# Patient Record
Sex: MALE | Race: WHITE | NOT HISPANIC OR LATINO | URBAN - METROPOLITAN AREA
[De-identification: names, ages, dates, MRNs, and addresses within clinical notes are randomized per-mention and may not be internally consistent; named-entity substitution may affect disease eponyms.]

---

## 2020-11-23 ENCOUNTER — HOSPITAL ENCOUNTER (EMERGENCY)
Facility: HOSPITAL | Age: 30
Discharge: HOME | End: 2020-11-23
Attending: EMERGENCY MEDICINE | Admitting: EMERGENCY MEDICINE

## 2020-11-23 ENCOUNTER — APPOINTMENT (OUTPATIENT)
Dept: GENERAL RADIOLOGY | Facility: HOSPITAL | Age: 30
End: 2020-11-23

## 2020-11-23 VITALS
BODY MASS INDEX: 28.44 KG/M2 | RESPIRATION RATE: 18 BRPM | OXYGEN SATURATION: 98 % | HEART RATE: 64 BPM | TEMPERATURE: 98.7 F | WEIGHT: 210 LBS | SYSTOLIC BLOOD PRESSURE: 140 MMHG | HEIGHT: 72 IN | DIASTOLIC BLOOD PRESSURE: 79 MMHG

## 2020-11-23 DIAGNOSIS — R07.9 CHEST PAIN, UNSPECIFIED TYPE: Primary | ICD-10-CM

## 2020-11-23 LAB
ALBUMIN SERPL-MCNC: 4.3 G/DL (ref 3.5–5.2)
ALBUMIN/GLOB SERPL: 1.5 G/DL
ALP SERPL-CCNC: 68 U/L (ref 39–117)
ALT SERPL W P-5'-P-CCNC: 27 U/L (ref 1–41)
ANION GAP SERPL CALCULATED.3IONS-SCNC: 9.4 MMOL/L (ref 5–15)
AST SERPL-CCNC: 24 U/L (ref 1–40)
BASOPHILS # BLD AUTO: 0.04 10*3/MM3 (ref 0–0.2)
BASOPHILS NFR BLD AUTO: 0.7 % (ref 0–1.5)
BILIRUB SERPL-MCNC: 0.6 MG/DL (ref 0–1.2)
BUN SERPL-MCNC: 11 MG/DL (ref 6–20)
BUN/CREAT SERPL: 8.9 (ref 7–25)
CALCIUM SPEC-SCNC: 9.4 MG/DL (ref 8.6–10.5)
CHLORIDE SERPL-SCNC: 100 MMOL/L (ref 98–107)
CO2 SERPL-SCNC: 26.6 MMOL/L (ref 22–29)
CREAT SERPL-MCNC: 1.24 MG/DL (ref 0.76–1.27)
DEPRECATED RDW RBC AUTO: 39.5 FL (ref 37–54)
EOSINOPHIL # BLD AUTO: 0.07 10*3/MM3 (ref 0–0.4)
EOSINOPHIL NFR BLD AUTO: 1.2 % (ref 0.3–6.2)
ERYTHROCYTE [DISTWIDTH] IN BLOOD BY AUTOMATED COUNT: 12.7 % (ref 12.3–15.4)
GFR SERPL CREATININE-BSD FRML MDRD: 68 ML/MIN/1.73
GLOBULIN UR ELPH-MCNC: 2.9 GM/DL
GLUCOSE SERPL-MCNC: 97 MG/DL (ref 65–99)
HCT VFR BLD AUTO: 46.6 % (ref 37.5–51)
HGB BLD-MCNC: 16.1 G/DL (ref 13–17.7)
HOLD SPECIMEN: NORMAL
HOLD SPECIMEN: NORMAL
IMM GRANULOCYTES # BLD AUTO: 0.01 10*3/MM3 (ref 0–0.05)
IMM GRANULOCYTES NFR BLD AUTO: 0.2 % (ref 0–0.5)
LYMPHOCYTES # BLD AUTO: 1.46 10*3/MM3 (ref 0.7–3.1)
LYMPHOCYTES NFR BLD AUTO: 25.9 % (ref 19.6–45.3)
MCH RBC QN AUTO: 30 PG (ref 26.6–33)
MCHC RBC AUTO-ENTMCNC: 34.5 G/DL (ref 31.5–35.7)
MCV RBC AUTO: 86.9 FL (ref 79–97)
MONOCYTES # BLD AUTO: 0.4 10*3/MM3 (ref 0.1–0.9)
MONOCYTES NFR BLD AUTO: 7.1 % (ref 5–12)
NEUTROPHILS NFR BLD AUTO: 3.65 10*3/MM3 (ref 1.7–7)
NEUTROPHILS NFR BLD AUTO: 64.9 % (ref 42.7–76)
NRBC BLD AUTO-RTO: 0 /100 WBC (ref 0–0.2)
PLATELET # BLD AUTO: 188 10*3/MM3 (ref 140–450)
PMV BLD AUTO: 9.1 FL (ref 6–12)
POTASSIUM SERPL-SCNC: 3.8 MMOL/L (ref 3.5–5.2)
PROT SERPL-MCNC: 7.2 G/DL (ref 6–8.5)
QT INTERVAL: 433 MS
RBC # BLD AUTO: 5.36 10*6/MM3 (ref 4.14–5.8)
SODIUM SERPL-SCNC: 136 MMOL/L (ref 136–145)
TROPONIN T SERPL-MCNC: <0.01 NG/ML (ref 0–0.03)
TROPONIN T SERPL-MCNC: <0.01 NG/ML (ref 0–0.03)
WBC # BLD AUTO: 5.63 10*3/MM3 (ref 3.4–10.8)
WHOLE BLOOD HOLD SPECIMEN: NORMAL
WHOLE BLOOD HOLD SPECIMEN: NORMAL

## 2020-11-23 PROCEDURE — 99284 EMERGENCY DEPT VISIT MOD MDM: CPT

## 2020-11-23 PROCEDURE — 93005 ELECTROCARDIOGRAM TRACING: CPT | Performed by: EMERGENCY MEDICINE

## 2020-11-23 PROCEDURE — 84484 ASSAY OF TROPONIN QUANT: CPT | Performed by: EMERGENCY MEDICINE

## 2020-11-23 PROCEDURE — 93010 ELECTROCARDIOGRAM REPORT: CPT | Performed by: INTERNAL MEDICINE

## 2020-11-23 PROCEDURE — 71046 X-RAY EXAM CHEST 2 VIEWS: CPT

## 2020-11-23 PROCEDURE — 85025 COMPLETE CBC W/AUTO DIFF WBC: CPT

## 2020-11-23 PROCEDURE — 80053 COMPREHEN METABOLIC PANEL: CPT | Performed by: EMERGENCY MEDICINE

## 2020-11-23 PROCEDURE — 93005 ELECTROCARDIOGRAM TRACING: CPT

## 2020-11-23 RX ORDER — SODIUM CHLORIDE 0.9 % (FLUSH) 0.9 %
10 SYRINGE (ML) INJECTION AS NEEDED
Status: DISCONTINUED | OUTPATIENT
Start: 2020-11-23 | End: 2020-11-23 | Stop reason: HOSPADM

## 2020-11-23 RX ORDER — METHIMAZOLE 5 MG/1
5 TABLET ORAL DAILY
COMMUNITY

## 2020-11-23 RX ORDER — ESCITALOPRAM OXALATE 20 MG/1
20 TABLET ORAL DAILY
COMMUNITY

## 2020-11-23 RX ORDER — ASPIRIN 325 MG
325 TABLET ORAL ONCE
Status: COMPLETED | OUTPATIENT
Start: 2020-11-23 | End: 2020-11-23

## 2020-11-23 RX ADMIN — ASPIRIN 325 MG: 325 TABLET ORAL at 12:15

## 2020-11-23 NOTE — ED PROVIDER NOTES
12:42 EST  Patient seen and examined with physician assistant.  Briefly patient presents for evaluation of chest pain.  States he has been having chest pain for about a week.  States pain got worse in his arm and chest today.  This was lasted just a few seconds.  The low-level chest pain has been present for a week.  Patient has no lower extremity swelling.  No hemoptysis.  Is not exertional.  He actually worked out yesterday and had no pain with exertion.  Has had no vomiting or diarrhea.          On exam patient is alert cooperative in no distress.  Patient's heart regular rate and rhythm.  Patient's lungs are clear bilaterally.  Patient's chest and arm are nontender with movement.PERC negative        Plan is EKG chest x-ray and lab evaluation      MD ATTESTATION NOTE    The JORGE LUIS and I have discussed this patient's history, physical exam, and treatment plan.  I have reviewed the documentation and personally had a face to face interaction with the patient. I affirm the documentation and agree with the treatment and plan.  The attached note describes my personal findings.      Patient was wearing a face mask when I entered the room and they continued to wear a mask throughout their stay in the ED.  I wore PPE, including a gown, gloves, face mask with shield or face mask with goggles whenever I was in the room with patient.            Brody Fitch MD  11/23/20 9821

## 2020-11-23 NOTE — ED NOTES
Pt noted to have mask on when this RN entered the room.  This RN wore appropriate PPE throughout our encounter. Hand hygiene performed upon entering and exiting room.       Joleen Vizcarra RN  11/23/20 9210

## 2020-11-23 NOTE — ED NOTES
Patient here today for chest pain for the last 2 days, denies chest pain at present but does c/o left arm pain with a pain level of 4.  No nausea or vomiting at this time.     Joleen Vizcarra, RN  11/23/20 0911

## 2020-11-23 NOTE — ED NOTES
Pt noted to have mask on when this RN entered the room.  This RN wore appropriate PPE throughout our encounter. Hand hygiene performed upon entering and exiting room.       Cathie Garcia, RN  11/23/20 2496

## 2020-11-23 NOTE — ED PROVIDER NOTES
EMERGENCY DEPARTMENT ENCOUNTER    Room Number:  32/32  Date of encounter:  11/23/2020  PCP: Rodney Mccoy MD  Historian: Patient      HPI:  Chief Complaint: Chest pain  A complete HPI/ROS/PMH/PSH/SH/FH are unobtainable due to: Nothing    Context: Brian Castellon is a very pleasant 30 y.o. male who presents to the ED c/o ongoing chest pain for about a week.  Patient states the pain has been a constant 2 out of 10 and dull on the pain scale since its onset and is associated with short exacerbations of pain that can be as bad as an 8 out of 10 on the pain scale.  He describes the pain as a pressure, substernal, radiates to the left arm, and is associated with nausea.  There has been no active vomiting or diaphoresis.  The chest pain is state to not be exertional and the exacerbations tend to come on at rest.  The patient also denies associated fever, cough, chills or any recent sick contacts.  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      PAST MEDICAL HISTORY  Active Ambulatory Problems     Diagnosis Date Noted   • No Active Ambulatory Problems     Resolved Ambulatory Problems     Diagnosis Date Noted   • No Resolved Ambulatory Problems     No Additional Past Medical History         PAST SURGICAL HISTORY  History reviewed. No pertinent surgical history.      FAMILY HISTORY  History reviewed. No pertinent family history.      SOCIAL HISTORY  Social History     Socioeconomic History   • Marital status:      Spouse name: Not on file   • Number of children: Not on file   • Years of education: Not on file   • Highest education level: Not on file   Tobacco Use   • Smoking status: Never Smoker   Substance and Sexual Activity   • Alcohol use: Not Currently   • Drug use: Never   • Sexual activity: Yes     Partners: Female         ALLERGIES  Patient has no known allergies.        REVIEW OF SYSTEMS  Review of Systems   Constitutional: Negative for  chills and fever.   HENT: Negative for congestion.    Eyes: Negative.    Respiratory: Negative for cough and shortness of breath.    Cardiovascular: Positive for chest pain. Negative for palpitations and leg swelling.   Gastrointestinal: Positive for nausea. Negative for abdominal pain, diarrhea and vomiting.   Genitourinary: Negative.    Musculoskeletal: Negative.    Skin: Negative.    Neurological: Negative.    Psychiatric/Behavioral: Negative.         All systems reviewed and negative except for those discussed in HPI.       PHYSICAL EXAM    I have reviewed the triage vital signs and nursing notes.    ED Triage Vitals   Temp Heart Rate Resp BP SpO2   11/23/20 1115 11/23/20 1115 11/23/20 1132 11/23/20 1123 11/23/20 1115   98.7 °F (37.1 °C) 64 14 141/84 97 %      Temp src Heart Rate Source Patient Position BP Location FiO2 (%)   11/23/20 1115 11/23/20 1132 11/23/20 1132 11/23/20 1132 --   Tympanic Monitor Lying Left arm        Physical Exam  GENERAL: Very pleasant well-developed well-nourished male in no acute distress  HENT: nares patent, mucous membranes moist, atraumatic  EYES: no scleral icterus  CV: regular rhythm, regular rate, no rubs murmurs gallops, no unilateral leg swelling pedal pulses bilaterally  RESPIRATORY: normal effort, lungs CTA B  ABDOMEN: soft, nontender  MUSCULOSKELETAL: no deformity  NEURO: alert and oriented x4, moves all extremities, follows commands  SKIN: warm, dry        LAB RESULTS  Recent Results (from the past 24 hour(s))   ECG 12 Lead    Collection Time: 11/23/20 11:25 AM   Result Value Ref Range    QT Interval 433 ms   Comprehensive Metabolic Panel    Collection Time: 11/23/20 11:29 AM    Specimen: Blood   Result Value Ref Range    Glucose 97 65 - 99 mg/dL    BUN 11 6 - 20 mg/dL    Creatinine 1.24 0.76 - 1.27 mg/dL    Sodium 136 136 - 145 mmol/L    Potassium 3.8 3.5 - 5.2 mmol/L    Chloride 100 98 - 107 mmol/L    CO2 26.6 22.0 - 29.0 mmol/L    Calcium 9.4 8.6 - 10.5 mg/dL    Total  Protein 7.2 6.0 - 8.5 g/dL    Albumin 4.30 3.50 - 5.20 g/dL    ALT (SGPT) 27 1 - 41 U/L    AST (SGOT) 24 1 - 40 U/L    Alkaline Phosphatase 68 39 - 117 U/L    Total Bilirubin 0.6 0.0 - 1.2 mg/dL    eGFR Non African Amer 68 >60 mL/min/1.73    Globulin 2.9 gm/dL    A/G Ratio 1.5 g/dL    BUN/Creatinine Ratio 8.9 7.0 - 25.0    Anion Gap 9.4 5.0 - 15.0 mmol/L   Troponin    Collection Time: 11/23/20 11:29 AM    Specimen: Blood   Result Value Ref Range    Troponin T <0.010 0.000 - 0.030 ng/mL   Light Blue Top    Collection Time: 11/23/20 11:29 AM   Result Value Ref Range    Extra Tube hold for add-on    Green Top (Gel)    Collection Time: 11/23/20 11:29 AM   Result Value Ref Range    Extra Tube Hold for add-ons.    Lavender Top    Collection Time: 11/23/20 11:29 AM   Result Value Ref Range    Extra Tube hold for add-on    Gold Top - SST    Collection Time: 11/23/20 11:29 AM   Result Value Ref Range    Extra Tube Hold for add-ons.    CBC Auto Differential    Collection Time: 11/23/20 11:29 AM    Specimen: Blood   Result Value Ref Range    WBC 5.63 3.40 - 10.80 10*3/mm3    RBC 5.36 4.14 - 5.80 10*6/mm3    Hemoglobin 16.1 13.0 - 17.7 g/dL    Hematocrit 46.6 37.5 - 51.0 %    MCV 86.9 79.0 - 97.0 fL    MCH 30.0 26.6 - 33.0 pg    MCHC 34.5 31.5 - 35.7 g/dL    RDW 12.7 12.3 - 15.4 %    RDW-SD 39.5 37.0 - 54.0 fl    MPV 9.1 6.0 - 12.0 fL    Platelets 188 140 - 450 10*3/mm3    Neutrophil % 64.9 42.7 - 76.0 %    Lymphocyte % 25.9 19.6 - 45.3 %    Monocyte % 7.1 5.0 - 12.0 %    Eosinophil % 1.2 0.3 - 6.2 %    Basophil % 0.7 0.0 - 1.5 %    Immature Grans % 0.2 0.0 - 0.5 %    Neutrophils, Absolute 3.65 1.70 - 7.00 10*3/mm3    Lymphocytes, Absolute 1.46 0.70 - 3.10 10*3/mm3    Monocytes, Absolute 0.40 0.10 - 0.90 10*3/mm3    Eosinophils, Absolute 0.07 0.00 - 0.40 10*3/mm3    Basophils, Absolute 0.04 0.00 - 0.20 10*3/mm3    Immature Grans, Absolute 0.01 0.00 - 0.05 10*3/mm3    nRBC 0.0 0.0 - 0.2 /100 WBC   Troponin    Collection Time:  11/23/20  1:24 PM    Specimen: Blood   Result Value Ref Range    Troponin T <0.010 0.000 - 0.030 ng/mL       Ordered the above labs and independently reviewed the results.        RADIOLOGY  Xr Chest 2 View    Result Date: 11/23/2020  Chest radiograph  HISTORY:Chest pain  TECHNIQUE: Two PA and lateral radiographs  COMPARISON:None      FINDINGS AND IMPRESSION: There is no pulmonary consolidation. No pneumothorax is seen. Cardiac silhouette is within normal limits for size. There is no pleural effusion.  This report was finalized on 11/23/2020 12:50 PM by Dr. Sumanth Ely M.D.        I ordered the above noted radiological studies. Reviewed by me and discussed with radiologist.  See dictation for official radiology interpretation.      PROCEDURES    Procedures      MEDICATIONS GIVEN IN ER    Medications   aspirin tablet 325 mg (325 mg Oral Given 11/23/20 1215)         PROGRESS, DATA ANALYSIS, CONSULTS, AND MEDICAL DECISION MAKING    All labs have been independently reviewed by me.  All radiology studies have been reviewed by me and discussed with radiologist dictating the report.   EKG's independently viewed and interpreted by me.  Discussion below represents my analysis of pertinent findings related to patient's condition, differential diagnosis, treatment plan and final disposition.        ED Course as of Nov 23 2034   Mon Nov 23, 2020   1254 I viewed the patient's chest x-ray and there are no acute findings    [RC]   1254 WBC: 5.63 [RC]   1254 RBC: 5.36 [RC]   1254 Hemoglobin: 16.1 [RC]   1254 Hematocrit: 46.6 [RC]   1254 Platelets: 188 [RC]   1255 HEART SCORE    History 0  ECG Normal (0)  Age < or = 45 (0)  Risk factors 1 or 2 (1)  Troponin < or = Normal limit (0)    This patient's HEART score is 1    HEART Score Key:  Scores 0-3: 0.9-1.7% risk of adverse cardiac event. In the HEART Score study, these patients were discharged (0.99% in the retrospective study, 1.7% in the prospective study)  Scores 4-6: 12-16.6%  risk of adverse cardiac event. In the HEART Score study, these patients were admitted to the hospital. (11.6% retrospective, 16.6% prospective)  Scores ?7: 50-65% risk of adverse cardiac event. In the HEART Score study, these patients were candidates for early invasive measures. (65.2% retrospective, 50.1% prospective)       [RC]   1257 EKG          EKG time: 1125  Rhythm/Rate: 64/sinus  P waves and CO: Normal pr interval  QRS, axis: Normal Axis   ST and T waves: No acute st or t wave cgs     Interpreted Contemporaneously by me, independently viewed  -no prior to compare.      [RC]      ED Course User Index  [RC] Carter Hamilton III, PA       Patient was placed in face mask in first look. Patient was wearing facemask when I entered the room and throughout our encounter. I wore full protective equipment throughout this patient encounter including a face mask, eye shield, gown and gloves. Hand hygiene was performed before donning protective equipment and after removal when leaving the room.  AS OF 20:34 EST VITALS:    BP - 140/79  HR - 64  TEMP - 98.7 °F (37.1 °C) (Tympanic)  O2 SATS - 98%        DIAGNOSIS  Final diagnoses:   Chest pain, unspecified type         DISPOSITION  DISCHARGE    Patient discharged in stable condition.    Reviewed implications of results, diagnosis, meds, responsibility to follow up, warning signs and symptoms of possible worsening, potential complications and reasons to return to ER.    Patient/Family voiced understanding of above instructions.    Discussed plan for discharge, as there is no emergent indication for admission. Patient referred to primary care provider for BP management due to today's BP. Pt/family is agreeable and understands need for follow up and repeat testing.  Pt is aware that discharge does not mean that nothing is wrong but it indicates no emergency is present that requires admission and they must continue care with follow-up as given below or physician of their  choice.     FOLLOW-UP  [facility] CARDIOLOGY  [address]  [facility] [address] [phone]  Schedule an appointment as soon as possible for a visit   For further evaluation and treatment    Rodney Mccoy MD  [address] [place] [address]  [phone]    Schedule an appointment as soon as possible for a visit   For further evaluation and interim management         Medication List      No changes were made to your prescriptions during this visit.                Carter Hamilton III, PA  11/23/20 2036

## 2020-11-23 NOTE — ED TRIAGE NOTES
Patient to er from home with c/o started last week with left arm pain that is increasing. Patient reported chest pain and dizziness that started this am around 10am. Patient reported slight nausea with this. Patient has mask on in triage along with staff.

## 2021-04-16 ENCOUNTER — BULK ORDERING (OUTPATIENT)
Dept: CASE MANAGEMENT | Facility: OTHER | Age: 31
End: 2021-04-16

## 2021-04-16 DIAGNOSIS — Z23 IMMUNIZATION DUE: ICD-10-CM

## 2023-12-14 ENCOUNTER — OFFICE VISIT (OUTPATIENT)
Dept: INTERNAL MEDICINE | Facility: CLINIC | Age: 33
End: 2023-12-14
Payer: COMMERCIAL

## 2023-12-14 VITALS
TEMPERATURE: 97.2 F | SYSTOLIC BLOOD PRESSURE: 116 MMHG | DIASTOLIC BLOOD PRESSURE: 74 MMHG | BODY MASS INDEX: 31.56 KG/M2 | HEART RATE: 75 BPM | OXYGEN SATURATION: 97 % | RESPIRATION RATE: 16 BRPM | WEIGHT: 233 LBS | HEIGHT: 72 IN

## 2023-12-14 DIAGNOSIS — F41.0 PANIC ATTACK: ICD-10-CM

## 2023-12-14 DIAGNOSIS — Z23 ENCOUNTER FOR IMMUNIZATION: Primary | ICD-10-CM

## 2023-12-14 PROBLEM — E05.00 GRAVES DISEASE: Status: ACTIVE | Noted: 2018-04-16

## 2023-12-14 PROBLEM — E05.90 HYPERTHYROIDISM: Status: ACTIVE | Noted: 2017-09-07

## 2023-12-14 RX ORDER — LORAZEPAM 0.5 MG/1
0.5 TABLET ORAL EVERY 8 HOURS PRN
Qty: 30 TABLET | Refills: 0 | Status: SHIPPED | OUTPATIENT
Start: 2023-12-14

## 2023-12-14 RX ORDER — METHIMAZOLE 5 MG/1
10 TABLET ORAL DAILY
COMMUNITY
Start: 2023-11-08 | End: 2024-02-06

## 2023-12-14 RX ORDER — BUSPIRONE HYDROCHLORIDE 10 MG/1
10 TABLET ORAL 3 TIMES DAILY
Qty: 90 TABLET | Refills: 2 | Status: SHIPPED | OUTPATIENT
Start: 2023-12-14

## 2023-12-14 RX ORDER — ESCITALOPRAM OXALATE 20 MG/1
20 TABLET ORAL DAILY
Qty: 30 TABLET | Refills: 2 | Status: SHIPPED | OUTPATIENT
Start: 2023-12-14

## 2023-12-14 NOTE — PROGRESS NOTES
"Chief Complaint  Establish Care and Annual Exam    Subjective        Brian Castellon presents to [facility]  History of Present Illness  Here today to establish care. Current concerns include anxiety. Patient states he has long history of anxiety. Anxiety worsened in July 2023 after recurrence of hyperthyroidism secondary to Graves Disease (initially diagnosed in 2017). At the time patient also had paroxysmal afib. TSH/T4 now wnl on methimazole. Planning for thyroidectomy in January.   Patient states he was having panic attacks once every other day this summer. Now he has one about every two weeks. He was previously on Escitalopram 10mg which was increased to 20 mg in August which has seemed to help; however, he states he still has difficulty sleeping and feels very anxious.     Patient was in [place] from Nov 2022 - Nov 2023 and had treatment for anxiety while there.   Was noted to have elevated kidney function while in [facility].       Objective   Vital Signs:  /74   Pulse 75   Temp 97.2 °F (36.2 °C)   Resp 16   Ht 182.9 cm (72\")   Wt 106 kg (233 lb)   SpO2 97%   BMI 31.60 kg/m²   Estimated body mass index is 31.6 kg/m² as calculated from the following:    Height as of this encounter: 182.9 cm (72\").    Weight as of this encounter: 106 kg (233 lb).             Physical Exam  Constitutional:       Appearance: Normal appearance.   HENT:      Head: Normocephalic and atraumatic.      Right Ear: External ear normal.      Left Ear: External ear normal.      Nose: Nose normal.      Mouth/Throat:      Mouth: Mucous membranes are moist.   Eyes:      Extraocular Movements: Extraocular movements intact.      Conjunctiva/sclera: Conjunctivae normal.   Cardiovascular:      Rate and Rhythm: Normal rate and regular rhythm.      Heart sounds: Normal heart sounds.   Pulmonary:      Effort: Pulmonary effort is normal.      Breath sounds: Normal breath sounds. "   Musculoskeletal:         General: Normal range of motion.      Cervical back: Normal range of motion.   Skin:     General: Skin is warm.   Neurological:      General: No focal deficit present.      Mental Status: He is alert and oriented to person, place, and time. Mental status is at baseline.      Cranial Nerves: No cranial nerve deficit.   Psychiatric:         Mood and Affect: Mood normal.         Behavior: Behavior normal.         Thought Content: Thought content normal.        Result Review :  The following data was reviewed by: Derik Sheehan MD on 12/14/2023:  Common labs          11/7/2023    10:46   Common Labs   WBC 5.18       Hemoglobin 16.5       Hematocrit 49.0       Platelets 197          Details          This result is from an external source.             Data reviewed : cardiology and endocrine notes             Assessment and Plan     Brian Castellon is a 33 y.o. male with Grave's Disease, paroxysmal atrial fibrillation and anxiety who presents today to establish care. Current concerns include worsening anxiety and increase in panic attacks. This seemed to start in the summer when he had recurrence of afib. Has noticed some improvement with increased dose of Lexapro. Will continue Lexapro at this dose and add Buspar, as well as Ativan for panic attacks. Will reevaluate after thyroidectomy. If symptoms persist or worsen, may need to change regimen.     Diagnoses and all orders for this visit:    1. Encounter for immunization (Primary)  -     Cancel: Fluzone >6 Months ([id number])    2. Panic attack  -     busPIRone (BUSPAR) 10 MG tablet; Take 1 tablet by mouth 3 (Three) Times a Day.  Dispense: 90 tablet; Refill: 2  -     LORazepam (Ativan) 0.5 MG tablet; Take 1 tablet by mouth Every 8 (Eight) Hours As Needed for Anxiety (severe panic attacks).  Dispense: 30 tablet; Refill: 0  -     escitalopram (LEXAPRO) 20 MG tablet; Take 1 tablet by mouth Daily.  Dispense: 30 tablet; Refill: 2           I spent 15  minutes caring for Brian on this date of service. This time includes time spent by me in the following activities:preparing for the visit, reviewing tests, obtaining and/or reviewing a separately obtained history, performing a medically appropriate examination and/or evaluation , counseling and educating the patient/family/caregiver, ordering medications, tests, or procedures, and documenting information in the medical record  Follow Up   Return in about 2 months (around 2/14/2024) for Recheck.  Patient was given instructions and counseling regarding his condition or for health maintenance advice. Please see specific information pulled into the AVS if appropriate.     Patient seen and examined personally by me following resident evaluation. Agree with assessment and plan as above unless documented below.    Derik Sheehan MD  [facility]  [address]  Phone: [phone]

## 2024-03-26 DIAGNOSIS — F41.0 PANIC ATTACK: ICD-10-CM

## 2024-03-26 RX ORDER — ESCITALOPRAM OXALATE 20 MG/1
20 TABLET ORAL DAILY
Qty: 30 TABLET | Refills: 2 | Status: SHIPPED | OUTPATIENT
Start: 2024-03-26

## 2024-04-15 ENCOUNTER — OFFICE VISIT (OUTPATIENT)
Dept: INTERNAL MEDICINE | Facility: CLINIC | Age: 34
End: 2024-04-15
Payer: COMMERCIAL

## 2024-04-15 VITALS
HEART RATE: 58 BPM | SYSTOLIC BLOOD PRESSURE: 112 MMHG | DIASTOLIC BLOOD PRESSURE: 68 MMHG | HEIGHT: 72 IN | RESPIRATION RATE: 16 BRPM | WEIGHT: 239.6 LBS | BODY MASS INDEX: 32.45 KG/M2 | OXYGEN SATURATION: 97 %

## 2024-04-15 DIAGNOSIS — F41.1 GENERALIZED ANXIETY DISORDER: Primary | ICD-10-CM

## 2024-04-15 DIAGNOSIS — E89.0 POST-OPERATIVE HYPOTHYROIDISM: ICD-10-CM

## 2024-04-15 DIAGNOSIS — E05.00 GRAVES DISEASE: ICD-10-CM

## 2024-04-15 PROCEDURE — 99214 OFFICE O/P EST MOD 30 MIN: CPT | Performed by: INTERNAL MEDICINE

## 2024-04-15 RX ORDER — LEVOTHYROXINE SODIUM 175 UG/1
175 TABLET ORAL DAILY
COMMUNITY
Start: 2024-03-14

## 2024-04-15 RX ORDER — BUPROPION HYDROCHLORIDE 150 MG/1
150 TABLET ORAL DAILY
Qty: 90 TABLET | Refills: 1 | Status: SHIPPED | OUTPATIENT
Start: 2024-04-15

## 2024-04-15 RX ORDER — ESCITALOPRAM OXALATE 20 MG/1
20 TABLET ORAL DAILY
Qty: 90 TABLET | Refills: 1 | Status: SHIPPED | OUTPATIENT
Start: 2024-04-15

## 2024-04-16 NOTE — PROGRESS NOTES
"Chief Complaint  Anxiety    Subjective        Brian Castellon presents to [facility]  History of Present Illness  Here for anxiety f/u   He is now s/p thyroidectomy for grave's dz and was also noted to have thyroid cancer, no further treatment required  He is on LT4 replacement at 175 mcg daily and working with endo on getting under control, TSH most recently 29 with normal hormone level   He is also on selenium 50 mcg daily   Anxiety is better in some ways but worse in others  Wife noted buspar seemed to make his anxiety worse  The lexapro does work well, would be interested in another agent in addition to the lexapro   Has only needed a few doses of ativan     Objective   Vital Signs:  /68   Pulse 58   Resp 16   Ht 182.9 cm (72\")   Wt 109 kg (239 lb 9.6 oz)   SpO2 97%   BMI 32.50 kg/m²   Estimated body mass index is 32.5 kg/m² as calculated from the following:    Height as of this encounter: 182.9 cm (72\").    Weight as of this encounter: 109 kg (239 lb 9.6 oz).       Physical Exam  Vitals and nursing note reviewed.   Constitutional:       General: He is not in acute distress.     Appearance: Normal appearance. He is not toxic-appearing.   HENT:      Head: Normocephalic and atraumatic.      Right Ear: External ear normal.      Left Ear: External ear normal.      Nose: Nose normal.   Eyes:      General: No scleral icterus.        Right eye: No discharge.         Left eye: No discharge.      Extraocular Movements: Extraocular movements intact.      Conjunctiva/sclera: Conjunctivae normal.      Pupils: Pupils are equal, round, and reactive to light.   Cardiovascular:      Rate and Rhythm: Normal rate and regular rhythm.      Pulses: Normal pulses.      Heart sounds: Normal heart sounds. No murmur heard.  Pulmonary:      Effort: Pulmonary effort is normal.   Musculoskeletal:         General: Normal range of motion.   Skin:     General: Skin is warm.      " Capillary Refill: Capillary refill takes less than 2 seconds.      Comments: Well healed low neck scar   Neurological:      General: No focal deficit present.      Mental Status: He is alert and oriented to person, place, and time. Mental status is at baseline.      Cranial Nerves: No cranial nerve deficit.      Gait: Gait normal.   Psychiatric:         Mood and Affect: Mood normal.         Behavior: Behavior normal.         Thought Content: Thought content normal.         Judgment: Judgment normal.        Result Review :    The following data was reviewed by: Derik Sheehan MD on 04/15/2024:  Common labs          11/7/2023    10:46 2/5/2024    15:59   Common Labs   Calcium  9.0       WBC 5.18        Hemoglobin 16.5        Hematocrit 49.0        Platelets 197           Details          This result is from an external source.             Data reviewed : prior office notes reviewed             Assessment and Plan     Diagnoses and all orders for this visit:    1. Generalized anxiety disorder (Primary)  -     escitalopram (LEXAPRO) 20 MG tablet; Take 1 tablet by mouth Daily.  Dispense: 90 tablet; Refill: 1  -     buPROPion XL (Wellbutrin XL) 150 MG 24 hr tablet; Take 1 tablet by mouth Daily.  Dispense: 90 tablet; Refill: 1  Continue lexapro 20mg daily  Buspar did not seem effective  Will trial wellbutrin for augmentation of SSRI  Also discussed abilify/seroquel if needed for augmentation if wellbutrin not effective vs changing to alternative SSRI/SNRI    2. Graves disease  3. Post-operative hypothyroidism  - s/p thyroidectomy and now on levothyroxine with most recent dose adjusted to 175mcg daily, has f/u with endo in few weeks for lab repeat         I spent 15 minutes caring for Brian on this date of service. This time includes time spent by me in the following activities:preparing for the visit, reviewing tests, obtaining and/or reviewing a separately obtained history, performing a medically appropriate examination  and/or evaluation , counseling and educating the patient/family/caregiver, ordering medications, tests, or procedures, and documenting information in the medical record  Follow Up     Return in about 3 months (around 7/15/2024) for Recheck.  Patient was given instructions and counseling regarding his condition or for health maintenance advice. Please see specific information pulled into the AVS if appropriate.     Derik Sheehan MD  [facility]  [address] [place] [address]  Phone: [phone]

## 2024-06-21 ENCOUNTER — OFFICE VISIT (OUTPATIENT)
Dept: INTERNAL MEDICINE | Facility: CLINIC | Age: 34
End: 2024-06-21
Payer: COMMERCIAL

## 2024-06-21 VITALS
DIASTOLIC BLOOD PRESSURE: 86 MMHG | BODY MASS INDEX: 32.51 KG/M2 | OXYGEN SATURATION: 99 % | SYSTOLIC BLOOD PRESSURE: 142 MMHG | RESPIRATION RATE: 16 BRPM | TEMPERATURE: 97.9 F | HEIGHT: 72 IN | WEIGHT: 240 LBS | HEART RATE: 74 BPM

## 2024-06-21 DIAGNOSIS — E89.0 POST-OPERATIVE HYPOTHYROIDISM: ICD-10-CM

## 2024-06-21 DIAGNOSIS — M79.10 MUSCLE PAIN: Primary | ICD-10-CM

## 2024-06-21 DIAGNOSIS — R53.83 OTHER FATIGUE: ICD-10-CM

## 2024-06-21 DIAGNOSIS — E05.00 GRAVES DISEASE: ICD-10-CM

## 2024-06-21 PROCEDURE — 99214 OFFICE O/P EST MOD 30 MIN: CPT | Performed by: INTERNAL MEDICINE

## 2024-06-21 RX ORDER — LIOTHYRONINE SODIUM 5 UG/1
5 TABLET ORAL DAILY
COMMUNITY
Start: 2024-06-13 | End: 2024-09-13

## 2024-06-22 NOTE — PROGRESS NOTES
Chief Complaint  Follow-up (Thyroid )    Subjective        Brian Castellon presents to [facility]  History of Present Illness  Here for follow up   He reports more muscle aches/pains, not feeling well, fatigued, feels like some of his hyperthyroid symptoms have recurred  He did get thyroid labs 6/13 which looked better but free T4 was increased some, he is on cytomel now as well     Current Outpatient Medications:     escitalopram (LEXAPRO) 20 MG tablet, Take 1 tablet by mouth Daily., Disp: 90 tablet, Rfl: 1    levothyroxine (SYNTHROID, LEVOTHROID) 175 MCG tablet, Take 1 tablet by mouth Daily., Disp: , Rfl:     liothyronine (CYTOMEL) 5 MCG tablet, Take 1 tablet by mouth Daily., Disp: , Rfl:     LORazepam (Ativan) 0.5 MG tablet, Take 1 tablet by mouth Every 8 (Eight) Hours As Needed for Anxiety (severe panic attacks)., Disp: 30 tablet, Rfl: 0    selenium 50 MCG tablet, Take 4 tablets by mouth Daily., Disp: , Rfl:   Past Medical History:   Diagnosis Date    Anxiety 06/01/2012    Asthma 02/01/2009    Hyperthyroidism 08/01/2017     Past Surgical History:   Procedure Laterality Date    APPENDECTOMY  05/01/1998    THYROIDECTOMY  02/05/2024     Family History   Problem Relation Age of Onset    Anxiety disorder Father     Depression Father     Hyperlipidemia Father      Social History     Socioeconomic History    Marital status:    Tobacco Use    Smoking status: Never    Smokeless tobacco: Never   Vaping Use    Vaping status: Never Used   Substance and Sexual Activity    Alcohol use: Yes     Alcohol/week: 1.0 standard drink of alcohol     Types: 1 Cans of beer per week    Drug use: Never    Sexual activity: Yes     Partners: Female     Birth control/protection: None        reviewed and updated    Objective   Vital Signs:  /86 (BP Location: Left arm, Patient Position: Sitting, Cuff Size: Large Adult)   Pulse 74   Temp 97.9 °F (36.6 °C) (Temporal)   Resp 16    "Ht 182.9 cm (72\")   Wt 109 kg (240 lb)   SpO2 99%   BMI 32.55 kg/m²   Estimated body mass index is 32.55 kg/m² as calculated from the following:    Height as of this encounter: 182.9 cm (72\").    Weight as of this encounter: 109 kg (240 lb).    Physical Exam  Vitals and nursing note reviewed.   Constitutional:       General: He is not in acute distress.     Appearance: Normal appearance. He is not toxic-appearing.   HENT:      Head: Normocephalic and atraumatic.      Right Ear: External ear normal.      Left Ear: External ear normal.      Nose: Nose normal.   Eyes:      General: No scleral icterus.        Right eye: No discharge.         Left eye: No discharge.      Extraocular Movements: Extraocular movements intact.      Conjunctiva/sclera: Conjunctivae normal.      Pupils: Pupils are equal, round, and reactive to light.   Cardiovascular:      Rate and Rhythm: Normal rate and regular rhythm.      Pulses: Normal pulses.      Heart sounds: Normal heart sounds. No murmur heard.  Pulmonary:      Effort: Pulmonary effort is normal.   Musculoskeletal:         General: Normal range of motion.   Skin:     General: Skin is warm.      Capillary Refill: Capillary refill takes less than 2 seconds.   Neurological:      General: No focal deficit present.      Mental Status: He is alert and oriented to person, place, and time. Mental status is at baseline.      Cranial Nerves: No cranial nerve deficit.      Gait: Gait normal.   Psychiatric:         Mood and Affect: Mood normal.         Behavior: Behavior normal.         Thought Content: Thought content normal.         Judgment: Judgment normal.        Result Review :  The following data was reviewed by: Derik Sheehan MD on 06/21/2024:  Common labs          11/7/2023    10:46 2/5/2024    15:59   Common Labs   Calcium  9.0       WBC 5.18        Hemoglobin 16.5        Hematocrit 49.0        Platelets 197           Details          This result is from an external source.         "     Data reviewed : prior office notes reviewed           Assessment and Plan   Diagnoses and all orders for this visit:    1. Muscle pain (Primary)  -     CBC w AUTO Differential  -     CK  -     FSH & LH  -     Testosterone, Free, Total  -     C-reactive protein  -     Sedimentation rate, automated  -     RODRIGUEZ by IFA, Reflex 9-biomarkers profile    2. Other fatigue  -     CBC w AUTO Differential  -     Comprehensive metabolic panel  -     Iron and TIBC  -     Ferritin  -     Vitamin B12  -     Vitamin D 25 hydroxy  -     Calcium, Ionized    3. Post-operative hypothyroidism  -     CBC w AUTO Differential  -     Comprehensive metabolic panel  -     CK  -     Iron and TIBC  -     Ferritin  -     Vitamin B12  -     Vitamin D 25 hydroxy  -     Hemoglobin A1c  -     FSH & LH  -     Testosterone, Free, Total  -     C-reactive protein  -     Sedimentation rate, automated  -     RODRIGUEZ by IFA, Reflex 9-biomarkers profile  -     Calcium, Ionized    4. Graves disease  -     CBC w AUTO Differential  -     Comprehensive metabolic panel  -     CK  -     Iron and TIBC  -     Ferritin  -     Vitamin B12  -     Vitamin D 25 hydroxy  -     Hemoglobin A1c  -     FSH & LH  -     Testosterone, Free, Total  -     C-reactive protein  -     Sedimentation rate, automated  -     RODRIGUEZ by IFA, Reflex 9-biomarkers profile    Check labs as above for worsening muscle aches/fatigue, certainly could be related to hypocalcemia or elyte derangement, reportedly still has 2 parathyroid glands, we will check inflammatory markers, autoimmune screening as well, suspect he may be hyperthyroid again.        I spent 15 minutes caring for Brian on this date of service. This time includes time spent by me in the following activities:preparing for the visit, reviewing tests, obtaining and/or reviewing a separately obtained history, performing a medically appropriate examination and/or evaluation , counseling and educating the patient/family/caregiver, ordering  medications, tests, or procedures, and documenting information in the medical record  Follow Up   Pending labs  Patient was given instructions and counseling regarding his condition or for health maintenance advice. Please see specific information pulled into the AVS if appropriate.     Derik Sheehan MD  [facility]

## 2024-06-25 LAB
25(OH)D3+25(OH)D2 SERPL-MCNC: 36.2 NG/ML (ref 30–100)
ALBUMIN SERPL-MCNC: 4.3 G/DL (ref 4.1–5.1)
ALP SERPL-CCNC: 86 IU/L (ref 44–121)
ALT SERPL-CCNC: 16 IU/L (ref 0–44)
ANA HOMOGEN TITR SER: ABNORMAL {TITER}
ANA SER QL IF: POSITIVE
AST SERPL-CCNC: 16 IU/L (ref 0–40)
BASOPHILS # BLD AUTO: 0.1 X10E3/UL (ref 0–0.2)
BASOPHILS NFR BLD AUTO: 1 %
BILIRUB SERPL-MCNC: 0.4 MG/DL (ref 0–1.2)
BUN SERPL-MCNC: 16 MG/DL (ref 6–20)
BUN/CREAT SERPL: 12 (ref 9–20)
CA-I SERPL ISE-MCNC: 5.2 MG/DL (ref 4.5–5.6)
CALCIUM SERPL-MCNC: 9.6 MG/DL (ref 8.7–10.2)
CENTROMERE B AB SER-ACNC: <0.2 AI (ref 0–0.9)
CHLORIDE SERPL-SCNC: 102 MMOL/L (ref 96–106)
CHROMATIN AB SERPL-ACNC: <0.2 AI (ref 0–0.9)
CK SERPL-CCNC: 92 U/L (ref 49–439)
CO2 SERPL-SCNC: 25 MMOL/L (ref 20–29)
CREAT SERPL-MCNC: 1.33 MG/DL (ref 0.76–1.27)
CRP SERPL-MCNC: 1 MG/L (ref 0–10)
DSDNA AB SER-ACNC: 3 IU/ML (ref 0–9)
EGFRCR SERPLBLD CKD-EPI 2021: 71 ML/MIN/1.73
ENA JO1 AB SER-ACNC: <0.2 AI (ref 0–0.9)
ENA RNP AB SER-ACNC: <0.2 AI (ref 0–0.9)
ENA SCL70 AB SER-ACNC: <0.2 AI (ref 0–0.9)
ENA SM AB SER-ACNC: <0.2 AI (ref 0–0.9)
ENA SS-A AB SER-ACNC: <0.2 AI (ref 0–0.9)
ENA SS-B AB SER-ACNC: <0.2 AI (ref 0–0.9)
EOSINOPHIL # BLD AUTO: 0.1 X10E3/UL (ref 0–0.4)
EOSINOPHIL NFR BLD AUTO: 1 %
ERYTHROCYTE [DISTWIDTH] IN BLOOD BY AUTOMATED COUNT: 13.1 % (ref 11.6–15.4)
ERYTHROCYTE [SEDIMENTATION RATE] IN BLOOD BY WESTERGREN METHOD: 5 MM/HR (ref 0–15)
FERRITIN SERPL-MCNC: 216 NG/ML (ref 30–400)
FSH SERPL-ACNC: 4.5 MIU/ML (ref 1.5–12.4)
GLOBULIN SER CALC-MCNC: 2.8 G/DL (ref 1.5–4.5)
GLUCOSE SERPL-MCNC: 98 MG/DL (ref 70–99)
HBA1C MFR BLD: 5.3 % (ref 4.8–5.6)
HCT VFR BLD AUTO: 45.1 % (ref 37.5–51)
HGB BLD-MCNC: 15.7 G/DL (ref 13–17.7)
IMM GRANULOCYTES # BLD AUTO: 0 X10E3/UL (ref 0–0.1)
IMM GRANULOCYTES NFR BLD AUTO: 0 %
IRON SATN MFR SERPL: 31 % (ref 15–55)
IRON SERPL-MCNC: 99 UG/DL (ref 38–169)
LABORATORY COMMENT REPORT: ABNORMAL
LH SERPL-ACNC: 4.6 MIU/ML (ref 1.7–8.6)
LYMPHOCYTES # BLD AUTO: 1.9 X10E3/UL (ref 0.7–3.1)
LYMPHOCYTES NFR BLD AUTO: 27 %
Lab: ABNORMAL
Lab: ABNORMAL
MCH RBC QN AUTO: 31.2 PG (ref 26.6–33)
MCHC RBC AUTO-ENTMCNC: 34.8 G/DL (ref 31.5–35.7)
MCV RBC AUTO: 90 FL (ref 79–97)
MONOCYTES # BLD AUTO: 0.4 X10E3/UL (ref 0.1–0.9)
MONOCYTES NFR BLD AUTO: 6 %
NEUTROPHILS # BLD AUTO: 4.4 X10E3/UL (ref 1.4–7)
NEUTROPHILS NFR BLD AUTO: 65 %
PLATELET # BLD AUTO: 223 X10E3/UL (ref 150–450)
POTASSIUM SERPL-SCNC: 4.2 MMOL/L (ref 3.5–5.2)
PROT SERPL-MCNC: 7.1 G/DL (ref 6–8.5)
RBC # BLD AUTO: 5.03 X10E6/UL (ref 4.14–5.8)
SODIUM SERPL-SCNC: 140 MMOL/L (ref 134–144)
TESTOST FREE SERPL-MCNC: 12.8 PG/ML (ref 8.7–25.1)
TESTOST SERPL-MCNC: 263 NG/DL (ref 264–916)
TIBC SERPL-MCNC: 322 UG/DL (ref 250–450)
UIBC SERPL-MCNC: 223 UG/DL (ref 111–343)
VIT B12 SERPL-MCNC: 347 PG/ML (ref 232–1245)
WBC # BLD AUTO: 6.8 X10E3/UL (ref 3.4–10.8)

## 2024-06-28 LAB
CYSTATIN C SERPL-MCNC: 0.99 MG/L (ref 0.6–1)
GFR/BSA.PRED SERPLBLD CYS-BASED-ARV: 87 ML/MIN/1.73
Lab: NORMAL
WRITTEN AUTHORIZATION: NORMAL

## 2024-10-10 ENCOUNTER — OFFICE VISIT (OUTPATIENT)
Dept: INTERNAL MEDICINE | Facility: CLINIC | Age: 34
End: 2024-10-10
Payer: COMMERCIAL

## 2024-10-10 VITALS
SYSTOLIC BLOOD PRESSURE: 118 MMHG | RESPIRATION RATE: 16 BRPM | HEART RATE: 55 BPM | DIASTOLIC BLOOD PRESSURE: 68 MMHG | HEIGHT: 72 IN | WEIGHT: 247.4 LBS | BODY MASS INDEX: 33.51 KG/M2 | OXYGEN SATURATION: 97 %

## 2024-10-10 DIAGNOSIS — F41.1 GENERALIZED ANXIETY DISORDER: Primary | ICD-10-CM

## 2024-10-10 PROBLEM — E05.00 GRAVES' DISEASE: Status: ACTIVE | Noted: 2017-07-01

## 2024-10-10 PROCEDURE — 90471 IMMUNIZATION ADMIN: CPT | Performed by: INTERNAL MEDICINE

## 2024-10-10 PROCEDURE — 90656 IIV3 VACC NO PRSV 0.5 ML IM: CPT | Performed by: INTERNAL MEDICINE

## 2024-10-10 PROCEDURE — 99214 OFFICE O/P EST MOD 30 MIN: CPT | Performed by: INTERNAL MEDICINE

## 2024-10-10 RX ORDER — LIOTHYRONINE SODIUM 5 UG/1
10 TABLET ORAL DAILY
COMMUNITY
Start: 2024-09-17 | End: 2024-12-16

## 2024-10-10 RX ORDER — LEVOTHYROXINE SODIUM 150 UG/1
150 TABLET ORAL DAILY
COMMUNITY
Start: 2024-09-17 | End: 2024-12-16

## 2024-11-21 ENCOUNTER — OFFICE VISIT (OUTPATIENT)
Dept: INTERNAL MEDICINE | Facility: CLINIC | Age: 34
End: 2024-11-21
Payer: COMMERCIAL

## 2024-11-21 VITALS
HEART RATE: 81 BPM | HEIGHT: 72 IN | OXYGEN SATURATION: 99 % | SYSTOLIC BLOOD PRESSURE: 122 MMHG | WEIGHT: 249 LBS | DIASTOLIC BLOOD PRESSURE: 82 MMHG | BODY MASS INDEX: 33.72 KG/M2

## 2024-11-21 DIAGNOSIS — F41.1 GENERALIZED ANXIETY DISORDER: ICD-10-CM

## 2024-11-21 DIAGNOSIS — E89.0 POST-OPERATIVE HYPOTHYROIDISM: ICD-10-CM

## 2024-11-21 DIAGNOSIS — F51.01 PRIMARY INSOMNIA: Primary | ICD-10-CM

## 2024-11-21 PROCEDURE — 99214 OFFICE O/P EST MOD 30 MIN: CPT | Performed by: INTERNAL MEDICINE

## 2024-11-21 RX ORDER — TRAZODONE HYDROCHLORIDE 50 MG/1
50 TABLET, FILM COATED ORAL NIGHTLY
Qty: 30 TABLET | Refills: 2 | Status: SHIPPED | OUTPATIENT
Start: 2024-11-21

## 2024-11-22 NOTE — PROGRESS NOTES
"Chief Complaint  Follow-up (Sleep quality has been changed is it from the med or he has new born and taking care of causing that )    Subjective        Brian Castellon presents to [facility]  History of Present Illness  Here for f/u SAFIA  Anxiety is much better on the prozac but sleep is worse  More issue falling and staying asleep   He also now has a 6 week  so that is likely playing a role although did not have these issues with their older son  Thyroid is under good control now    Objective   Vital Signs:  /82 (BP Location: Left arm, Patient Position: Sitting, Cuff Size: Adult)   Pulse 81   Ht 182.9 cm (72\")   Wt 113 kg (249 lb)   SpO2 99%   BMI 33.77 kg/m²   Estimated body mass index is 33.77 kg/m² as calculated from the following:    Height as of this encounter: 182.9 cm (72\").    Weight as of this encounter: 113 kg (249 lb).      Physical Exam  Vitals and nursing note reviewed.   Constitutional:       General: He is not in acute distress.     Appearance: Normal appearance. He is not toxic-appearing.   HENT:      Head: Normocephalic and atraumatic.      Right Ear: External ear normal.      Left Ear: External ear normal.      Nose: Nose normal.   Eyes:      General: No scleral icterus.        Right eye: No discharge.         Left eye: No discharge.      Extraocular Movements: Extraocular movements intact.      Conjunctiva/sclera: Conjunctivae normal.      Pupils: Pupils are equal, round, and reactive to light.   Cardiovascular:      Rate and Rhythm: Normal rate and regular rhythm.      Pulses: Normal pulses.      Heart sounds: Normal heart sounds. No murmur heard.  Pulmonary:      Effort: Pulmonary effort is normal.   Musculoskeletal:         General: Normal range of motion.   Skin:     General: Skin is warm.      Capillary Refill: Capillary refill takes less than 2 seconds.   Neurological:      General: No focal deficit present.      Mental " Status: He is alert and oriented to person, place, and time. Mental status is at baseline.      Cranial Nerves: No cranial nerve deficit.      Gait: Gait normal.   Psychiatric:         Mood and Affect: Mood normal.         Behavior: Behavior normal.         Thought Content: Thought content normal.         Judgment: Judgment normal.        Result Review :  The following data was reviewed by: Derik Sheehan MD on 2024:  Common labs          2024    15:59 2024    16:12   Common Labs   Glucose  98    BUN  16    Creatinine  1.33    Sodium  140    Potassium  4.2    Chloride  102    Calcium 9.0     9.6    Total Protein  7.1    Albumin  4.3    Total Bilirubin  0.4    Alkaline Phosphatase  86    AST (SGOT)  16    ALT (SGPT)  16    WBC  6.8    Hemoglobin  15.7    Hematocrit  45.1    Platelets  223    Hemoglobin A1C  5.3       Details          This result is from an external source.             Data reviewed : prior office notes reviewed           Assessment and Plan   Diagnoses and all orders for this visit:    1. Primary insomnia (Primary)  -     traZODone (DESYREL) 50 MG tablet; Take 1 tablet by mouth Every Night.  Dispense: 30 tablet; Refill: 2    2. Generalized anxiety disorder  -     FLUoxetine (PROzac) 20 MG capsule; Take 1 capsule by mouth Daily.  Dispense: 30 capsule; Refill: 2  Continue current dose of prozac 20mg daily   He is having some insomnia which could certainly be from the prozac but also with a 6 week old  which is likely also affecting the sleep wake cycle   He will let me know over next few weeks if not improving and we will change to zoloft    3. Post-operative hypothyroidism  Labs reviewed, under good control         I spent 15 minutes caring for Brian on this date of service. This time includes time spent by me in the following activities:preparing for the visit, reviewing tests, obtaining and/or reviewing a separately obtained history, performing a medically appropriate examination  and/or evaluation , counseling and educating the patient/family/caregiver, ordering medications, tests, or procedures, and documenting information in the medical record  Follow Up   F/u 3 months  Patient was given instructions and counseling regarding his condition or for health maintenance advice. Please see specific information pulled into the AVS if appropriate.     Derik Sheehan MD  [facility]  [address] [place] [address]  Phone: [phone]

## 2025-01-03 ENCOUNTER — TELEPHONE (OUTPATIENT)
Dept: INTERNAL MEDICINE | Facility: CLINIC | Age: 35
End: 2025-01-03

## 2025-01-03 NOTE — TELEPHONE ENCOUNTER
Called and left voicemail for patient letting him know we are needing to reschedule his appointment due to Dr. Sheehan being out sick today.

## 2025-02-21 ENCOUNTER — OFFICE VISIT (OUTPATIENT)
Dept: INTERNAL MEDICINE | Facility: CLINIC | Age: 35
End: 2025-02-21
Payer: COMMERCIAL

## 2025-02-21 VITALS
OXYGEN SATURATION: 99 % | SYSTOLIC BLOOD PRESSURE: 136 MMHG | RESPIRATION RATE: 16 BRPM | HEART RATE: 97 BPM | BODY MASS INDEX: 33.94 KG/M2 | DIASTOLIC BLOOD PRESSURE: 90 MMHG | WEIGHT: 250.6 LBS | HEIGHT: 72 IN

## 2025-02-21 DIAGNOSIS — R53.83 OTHER FATIGUE: ICD-10-CM

## 2025-02-21 DIAGNOSIS — R63.1 POLYDIPSIA: ICD-10-CM

## 2025-02-21 DIAGNOSIS — F51.01 PRIMARY INSOMNIA: ICD-10-CM

## 2025-02-21 DIAGNOSIS — M62.89 MUSCLE FATIGUE: ICD-10-CM

## 2025-02-21 DIAGNOSIS — E78.5 HYPERLIPIDEMIA, UNSPECIFIED HYPERLIPIDEMIA TYPE: ICD-10-CM

## 2025-02-21 DIAGNOSIS — R53.1 WEAKNESS: Primary | ICD-10-CM

## 2025-02-21 DIAGNOSIS — R06.83 SNORING: ICD-10-CM

## 2025-02-21 DIAGNOSIS — F41.1 GENERALIZED ANXIETY DISORDER: ICD-10-CM

## 2025-02-21 PROCEDURE — 99214 OFFICE O/P EST MOD 30 MIN: CPT | Performed by: INTERNAL MEDICINE

## 2025-02-21 RX ORDER — QUETIAPINE FUMARATE 25 MG/1
25 TABLET, FILM COATED ORAL NIGHTLY
Qty: 30 TABLET | Refills: 2 | Status: SHIPPED | OUTPATIENT
Start: 2025-02-21

## 2025-02-21 RX ORDER — SERTRALINE HYDROCHLORIDE 25 MG/1
25 TABLET, FILM COATED ORAL DAILY
Qty: 30 TABLET | Refills: 2 | Status: SHIPPED | OUTPATIENT
Start: 2025-02-21 | End: 2025-03-17

## 2025-03-03 DIAGNOSIS — F41.0 PANIC ATTACK: ICD-10-CM

## 2025-03-04 RX ORDER — LORAZEPAM 0.5 MG/1
0.5 TABLET ORAL EVERY 8 HOURS PRN
Qty: 30 TABLET | Refills: 0 | Status: SHIPPED | OUTPATIENT
Start: 2025-03-04

## 2025-03-05 ENCOUNTER — TELEPHONE (OUTPATIENT)
Dept: INTERNAL MEDICINE | Facility: CLINIC | Age: 35
End: 2025-03-05
Payer: COMMERCIAL

## 2025-03-05 NOTE — TELEPHONE ENCOUNTER
Okay for hub to read*  I called and LMTCB and ask for Katharine. I needed to verify information for his follow up appointment on 03/31/2025. Please try to reach Katharine

## 2025-03-11 DIAGNOSIS — R76.8 POSITIVE ANA (ANTINUCLEAR ANTIBODY): Primary | ICD-10-CM

## 2025-03-17 ENCOUNTER — OFFICE VISIT (OUTPATIENT)
Facility: HOSPITAL | Age: 35
End: 2025-03-17
Payer: COMMERCIAL

## 2025-03-17 VITALS — OXYGEN SATURATION: 98 % | WEIGHT: 246 LBS | HEIGHT: 72 IN | HEART RATE: 63 BPM | BODY MASS INDEX: 33.32 KG/M2

## 2025-03-17 DIAGNOSIS — G47.9 TROUBLE IN SLEEPING: ICD-10-CM

## 2025-03-17 DIAGNOSIS — R06.83 SNORING: Primary | ICD-10-CM

## 2025-03-17 DIAGNOSIS — E66.811 CLASS 1 OBESITY WITH BODY MASS INDEX (BMI) OF 33.0 TO 33.9 IN ADULT, UNSPECIFIED OBESITY TYPE, UNSPECIFIED WHETHER SERIOUS COMORBIDITY PRESENT: ICD-10-CM

## 2025-03-17 PROCEDURE — 99204 OFFICE O/P NEW MOD 45 MIN: CPT | Performed by: NURSE PRACTITIONER

## 2025-03-17 PROCEDURE — G0463 HOSPITAL OUTPT CLINIC VISIT: HCPCS

## 2025-03-17 NOTE — PROGRESS NOTES
"Chief Complaint  Follow-up (F/u for mental health, thyroid issues, and sleeping problems)    Subjective        Brian Castellon presents to [facility]    Mental health and sleep issues  Symptoms are: recurrent.   Onset was more than 5 years.   Symptoms occur: weekly.  Symptoms include: diaphoresis, fatigue, headaches, nausea, neck pain, numbness, vertigo, visual change and weakness.   Pertinent negative symptoms include no abdominal pain, no anorexia, no joint pain, no change in stool, no chest pain, no chills, no congestion, no cough, no fever, no joint swelling, no myalgias, no rash, no sore throat, no swollen glands, no dysuria and no vomiting.   Other symptom:  Panic attacks  Treatment and/or Medications comments include: Levothyroxine, Liothyronine, Escitalopram, Fluoxetine, Lorazepam, Ibuprofen, Tylenol     Here with quite a few issues  Wife present as well to aid in history   He has prior history of hyperthyroidism s/p thyroidectomy and managed on levothyroxine and liothyronine   He reports ongoing insomnia, worsening mental health issues including panic attacks  Worsening fatigues, muscle aches, heat intolerance, headache, muscle weakness  Some fasciculations as well noted    Objective   Vital Signs:  /90   Pulse 97   Resp 16   Ht 182.9 cm (72\")   Wt 114 kg (250 lb 9.6 oz)   SpO2 99%   BMI 33.99 kg/m²   Estimated body mass index is 33.99 kg/m² as calculated from the following:    Height as of this encounter: 182.9 cm (72\").    Weight as of this encounter: 114 kg (250 lb 9.6 oz).            Physical Exam  Vitals and nursing note reviewed.   Constitutional:       General: He is not in acute distress.     Appearance: Normal appearance. He is not toxic-appearing.   HENT:      Head: Normocephalic and atraumatic.      Right Ear: External ear normal.      Left Ear: External ear normal.      Nose: Nose normal.   Eyes:      General: No scleral icterus.       "  Right eye: No discharge.         Left eye: No discharge.      Extraocular Movements: Extraocular movements intact.      Conjunctiva/sclera: Conjunctivae normal.      Pupils: Pupils are equal, round, and reactive to light.   Cardiovascular:      Rate and Rhythm: Normal rate and regular rhythm.      Pulses: Normal pulses.      Heart sounds: Normal heart sounds. No murmur heard.  Pulmonary:      Effort: Pulmonary effort is normal.   Musculoskeletal:         General: Normal range of motion.      Comments: +clonus    Skin:     General: Skin is warm.      Capillary Refill: Capillary refill takes less than 2 seconds.   Neurological:      General: No focal deficit present.      Mental Status: He is alert and oriented to person, place, and time. Mental status is at baseline.      Cranial Nerves: No cranial nerve deficit.      Motor: Weakness present.      Gait: Gait normal.   Psychiatric:         Mood and Affect: Mood normal.         Behavior: Behavior normal.         Thought Content: Thought content normal.         Judgment: Judgment normal.        Result Review :  The following data was reviewed by: Derik Sheehan MD on 02/21/2025:  Common labs          6/21/2024    16:12 2/24/2025    08:26   Common Labs   Glucose 98  88    BUN 16  15    Creatinine 1.33  1.37    Sodium 140  142    Potassium 4.2  4.5    Chloride 102  105    Calcium 9.6  9.5    Albumin 4.3  4.3    Total Bilirubin 0.4  0.4    Alkaline Phosphatase 86  74    AST (SGOT) 16  22    ALT (SGPT) 16  21    WBC 6.8  5.3    Hemoglobin 15.7  15.7    Hematocrit 45.1  46.2    Platelets 223  200    Total Cholesterol  191    Triglycerides  114    HDL Cholesterol  34    LDL Cholesterol   136    Hemoglobin A1C 5.3  5.4      Data reviewed : prior office notes reviewed            Assessment and Plan   Diagnoses and all orders for this visit:    1. Weakness (Primary)  -     CBC w AUTO Differential; Future  -     Comprehensive metabolic panel; Future  -     Sedimentation rate,  automated; Future  -     C-reactive protein; Future  -     Cortisol - AM; Future  -     Testosterone, Free, Total; Future  Check labs as above, we will consider further eval with neurology as well given history and exam findings     2. Generalized anxiety disorder  -     sertraline (Zoloft) 25 MG tablet; Take 1 tablet by mouth Daily.  Dispense: 30 tablet; Refill: 2  Discussed medication options, change prozac to zoloft   Consider genesight testing     3. Muscle fatigue  -     Sedimentation rate, automated; Future  -     C-reactive protein; Future  -     CK; Future  -     RODRIGUEZ by IFA, Reflex 9-biomarkers profile; Future  -     Ambulatory Referral to Neurology  -     Acetylcholine Receptor Antibody Panel; Future  Given history of grave's disease and association with MG - check acetylcholine receptor ab panel and referral to neurology     4. Snoring  -     Ambulatory Referral to Sleep Medicine    5. Other fatigue  -     Iron and TIBC; Future  -     Ferritin; Future  -     Vitamin B12; Future    6. Primary insomnia  -     Ambulatory Referral to Sleep Medicine  -     QUEtiapine (SEROquel) 25 MG tablet; Take 1 tablet by mouth Every Night.  Dispense: 30 tablet; Refill: 2  Trial of seroquel for insomnia and mood d/o as above     7. Polydipsia  -     Hemoglobin A1c; Future    8. Hyperlipidemia, unspecified hyperlipidemia type  -     Lipid panel; Future           I spent 20 minutes caring for Brian on this date of service. This time includes time spent by me in the following activities:preparing for the visit, reviewing tests, obtaining and/or reviewing a separately obtained history, performing a medically appropriate examination and/or evaluation , counseling and educating the patient/family/caregiver, ordering medications, tests, or procedures, and documenting information in the medical record  Follow Up   No follow-ups on file.  Patient was given instructions and counseling regarding his condition or for health maintenance  advice. Please see specific information pulled into the AVS if appropriate.     Derik Sheehan MD  [facility]  [address]  Phone: [phone]

## 2025-03-17 NOTE — PROGRESS NOTES
[facility]  [address] [facility]   [address]  [place] [address]  Phone [phone]  Fax [phone]     Brian Castellon  [id number]   1990  34 y.o.  male      Referring Provider and PCP: Derik Sheehan MD    Type of service: Initial Sleep Medicine Consult  Date of service: 3/17/2025          CHIEF COMPLAINT: Witnessed apnea      HISTORY OF PRESENT ILLNESS:  Brian Castellon 34 y.o. was seen today on 3/17/2025 at [facility].  Patient has a history of thyroidectomy, anxiety, for which the patient follows with outside providers. Patient has no history of tonsillectomy, adenoidectomy, nasal surgery, UPPP.  Patient presents today with symptoms of snoring, non-restorative sleep, and witnessed apneas.  The symptoms are chronic in nature.  Patient reports having a thyroidectomy about 1 year ago as of February due to Graves' disease.  Was having some trouble sleeping before this.  Feels sleep issues have gotten worse over the last 6 months or so, before the birth of second child in October.  Other child is about 3 years old.  Patient has a hard time shutting off his mind at night.  May think of ideas that are really interesting or start reading and have a hard time shutting things down before bed.          SLEEP HISTORY:  Sleep schedule:  Bedtime: 11 PM to midnight  Wake time: 8 to 9 AM  Time it takes to fall asleep: 1 to 2 hours  Average hours of sleep: 5-6  Number of naps per day: 0    Symptoms:   In addition to the above, patient reports the following associated symptoms:  Have you ever awakened gasping for breath, coughing, choking: Yes   Change in weight:  Yes gained 30 pounds  Morning headaches:  No   Awaken with a sore throat or dry mouth:  No   Leg jerking at night:  No   Creepy crawly feeling in legs/urge to move legs: No   Teeth grinding: Yes   Have you ever awakened at night with a sour taste or burning sensation in your chest:  No   Do you have muscle weakness with laughing  "or anger:  No   Have you ever felt paralyzed while going to sleep or waking up:  No   Sleepwalking: No   Nightmares: No   Nocturia (urination at night): 1-2 times per night  Memory Problem: No     Medical Conditions (PMH):   Anxiety  History of Graves' disease  Thyroidectomy    Social history:  Do you drive a commercial vehicle:  No   Shift work:  No   Tobacco use:  No   Use: No  Alcohol use: 1-2 per week  Caffeinated drinks: 2-3 per day  Occupation:     Family History (parents and siblings) (pertaining to sleep medicine):  Insomnia  Thyroid disorder    Medications: reviewed    Allergies:  Patient has no known allergies.      REVIEW OF SYSTEMS:  Pertinent positive symptoms are:  Snoring  Witnessed apnea  [place] Sleepiness Scale of Total score: 3   Fatigue  Anxiety  Dyspnea        PHYSICAL EXAM:  CONSTITUTINONAL:   Vitals:    03/17/25 1300   Pulse: 63   SpO2: 98%   Weight: 112 kg (246 lb)   Height: 182.9 cm (72\")    Body mass index is 33.36 kg/m².   HEAD: atraumatic, normocephalic   THROAT: tonsils are not significant, Mallampati class II-III  NECK: Neck Circumference: 17 inches, trachea is midline  RESPIRATORY SYSTEM: Respirations even, unlabored, normal rate  CARDIOVASULAR SYSTEM: Normal rate, no edema   NEUROLOGICAL SYSTEM: Alert and oriented x 3  PSYCHIATRIC SYSTEM: Mood is normal/ appropriate     Office note(s) from care team reviewed. Office note(s) reviewed: 2/2025 family med note    Labs/ Test Results Reviewed:     Most Recent A1C          2/24/2025    08:26   HGBA1C Most Recent   Hemoglobin A1C 5.4    2/2025 CMP reviewed          ASSESSMENT AND PLAN:   Witnessed apnea: patient's symptoms and physical examination are concerning for possible sleep apnea.   I discussed the signs, symptoms, and pathophysiology of sleep apnea with this patient.  I also discussed the possible complications of untreated sleep apnea including but not limited to potential risk of resistant hypertension, insulin " resistance, pulmonary hypertension, atrial fibrillation or other arrhythmias, heart attack, stroke, nonrestorative sleep with hypersomnia which can increase risk for motor vehicle accidents, etc.   Different testing methods including home-based and lab based sleep studies were discussed with this patient.   Based on patient history and physical examination, will proceed with home sleep study.  The order for the sleep study is placed in [facility].  The test will be scheduled after prior authorization has been obtained through patient's insurance.  Discussed overview of treatment options for sleep apnea in the office today including PAP therapy, and treatment/ management will be discussed in more detail with this patient after the test is completed.  All questions were answered to patient's satisfaction.   Snoring: snoring is the sound created by turbulent airflow vibrating upper airway soft tissue.  I have also discussed factors affecting snoring including sleep deprivation, sleeping on the back and alcohol ingestion. To minimize snoring, patient is advised to have adequate sleep, sleep on their side, and avoid alcohol and sedative medications around bedtime.   Daytime fatigue: [place] Sleepiness Scale of Total score: 3.  There are many causes of daytime sleepiness and/or fatigue.  Rule out sleep apnea as a contributing factor, as above.  Do not drive, operate heavy machinery, or do activities that require high concentration if feeling tired/drowsy.  Obesity: Body mass index is 33.36 kg/m².. Patients who are overweight or obese are at increased risk of sleep apnea/ sleep disordered breathing. Weight reduction and healthy lifestyle are encouraged in overweight/ obese patients as part of a comprehensive approach to sleep apnea treatment.     Trouble sleeping: Discussed insomnia sergio and mindfulness sergio, [facility].  Recommended avoiding sleep deprivation and avoiding sleep restriction.  Discussed healthy sleep  habits including avoiding electronics in the bedroom, relaxing bedtime routine, cool quiet sleep environment.  Evaluate for sleep apnea as a contributing factor to sleep disturbances, as above.       Patient will follow-up after study, 31 to 90 days after PAP therapy initiated if applicable, or contact the office sooner for questions or concerns. Patient's questions were answered.          Thank you again for asking me to consult on this patient.  Please do not hesitate to call me if you have additional questions or concerns.       Radha Gonzalez DNP, APRN  [facility]

## 2025-03-24 ENCOUNTER — HOSPITAL ENCOUNTER (OUTPATIENT)
Dept: SLEEP MEDICINE | Facility: HOSPITAL | Age: 35
Discharge: HOME | End: 2025-03-24
Admitting: NURSE PRACTITIONER
Payer: COMMERCIAL

## 2025-03-24 DIAGNOSIS — E66.811 CLASS 1 OBESITY WITH BODY MASS INDEX (BMI) OF 33.0 TO 33.9 IN ADULT, UNSPECIFIED OBESITY TYPE, UNSPECIFIED WHETHER SERIOUS COMORBIDITY PRESENT: ICD-10-CM

## 2025-03-24 DIAGNOSIS — G47.9 TROUBLE IN SLEEPING: ICD-10-CM

## 2025-03-24 DIAGNOSIS — R06.83 SNORING: ICD-10-CM

## 2025-03-24 PROCEDURE — G0399 HOME SLEEP TEST/TYPE 3 PORTA: HCPCS

## 2025-03-27 DIAGNOSIS — R06.83 SNORING: ICD-10-CM

## 2025-03-27 DIAGNOSIS — G47.33 OSA (OBSTRUCTIVE SLEEP APNEA): Primary | ICD-10-CM

## 2025-03-28 ENCOUNTER — TELEPHONE (OUTPATIENT)
Dept: SLEEP MEDICINE | Facility: HOSPITAL | Age: 35
End: 2025-03-28
Payer: COMMERCIAL

## 2025-04-09 ENCOUNTER — TELEPHONE (OUTPATIENT)
Dept: NEUROLOGY | Facility: CLINIC | Age: 35
End: 2025-04-09
Payer: COMMERCIAL

## 2025-04-09 NOTE — TELEPHONE ENCOUNTER
LVM for pt to return call. We have an opening tomorrow at 8:00 with Dr. Boo. Patient is on our wait list.     We can move up appointment if time is still available.

## 2025-04-21 DIAGNOSIS — F51.01 PRIMARY INSOMNIA: ICD-10-CM

## 2025-04-21 NOTE — TELEPHONE ENCOUNTER
Rx Refill Note  Requested Prescriptions     Pending Prescriptions Disp Refills    QUEtiapine (SEROquel) 25 MG tablet 90 tablet 0     Sig: Take 1 tablet by mouth Every Night.      Last office visit with prescribing clinician: 2/21/2025   Last telemedicine visit with prescribing clinician: Visit date not found   Next office visit with prescribing clinician: Visit date not found                         Would you like a call back once the refill request has been completed: [] Yes [] No    If the office needs to give you a call back, can they leave a voicemail: [] Yes [] No    Eunice Owens MA  04/21/25, 13:57 EDT

## 2025-04-23 RX ORDER — QUETIAPINE FUMARATE 25 MG/1
25 TABLET, FILM COATED ORAL NIGHTLY
Qty: 90 TABLET | Refills: 0 | Status: SHIPPED | OUTPATIENT
Start: 2025-04-23

## 2025-04-28 ENCOUNTER — TELEPHONE (OUTPATIENT)
Dept: INTERNAL MEDICINE | Facility: CLINIC | Age: 35
End: 2025-04-28
Payer: COMMERCIAL

## 2025-04-28 NOTE — TELEPHONE ENCOUNTER
[facility] sent a 90-day refill request for Trazodone 50 mg tablets, but I don't see it on his med list.    Thank you

## 2025-05-01 ENCOUNTER — LAB (OUTPATIENT)
Dept: LAB | Facility: HOSPITAL | Age: 35
End: 2025-05-01
Payer: COMMERCIAL

## 2025-05-01 ENCOUNTER — OFFICE VISIT (OUTPATIENT)
Dept: NEUROLOGY | Facility: CLINIC | Age: 35
End: 2025-05-01
Payer: COMMERCIAL

## 2025-05-01 ENCOUNTER — PATIENT ROUNDING (BHMG ONLY) (OUTPATIENT)
Dept: NEUROLOGY | Facility: CLINIC | Age: 35
End: 2025-05-01
Payer: COMMERCIAL

## 2025-05-01 VITALS
BODY MASS INDEX: 32.78 KG/M2 | SYSTOLIC BLOOD PRESSURE: 130 MMHG | HEART RATE: 56 BPM | HEIGHT: 72 IN | OXYGEN SATURATION: 100 % | DIASTOLIC BLOOD PRESSURE: 88 MMHG | WEIGHT: 242 LBS

## 2025-05-01 DIAGNOSIS — R53.83 OTHER FATIGUE: Primary | ICD-10-CM

## 2025-05-01 DIAGNOSIS — R53.83 OTHER FATIGUE: ICD-10-CM

## 2025-05-01 PROCEDURE — 86042 ACETYLCHOLN RCPTR BLCKG ANTB: CPT

## 2025-05-01 PROCEDURE — 82525 ASSAY OF COPPER: CPT

## 2025-05-01 PROCEDURE — 36415 COLL VENOUS BLD VENIPUNCTURE: CPT

## 2025-05-01 PROCEDURE — 86043 ACETYLCHOLN RCPTR MODLG ANTB: CPT

## 2025-05-01 PROCEDURE — 82085 ASSAY OF ALDOLASE: CPT

## 2025-05-01 PROCEDURE — 86041 ACETYLCHOLN RCPTR BNDNG ANTB: CPT

## 2025-05-01 RX ORDER — LEVOTHYROXINE SODIUM 150 UG/1
150 TABLET ORAL EVERY 24 HOURS
COMMUNITY
Start: 2025-04-01

## 2025-05-01 RX ORDER — LIOTHYRONINE SODIUM 5 UG/1
10 TABLET ORAL DAILY
COMMUNITY
Start: 2024-09-17

## 2025-05-01 NOTE — PROGRESS NOTES
Notes by LPN:  Patient referred for muscle weakness. Patient reports muscle weakness, dizziness, nausea, energy levels fluctuation, headaches. This has been going on for on and off over the last year, but the last 6 months has gotten worse to where it is affecting his every day life. Noticed weakness in legs. Does have Grave's disease.            Subjective:     Patient ID: Brian Castellon is a 34 y.o. male.    Extremity Weakness  Associated symptoms: numbness      The following portions of the patient's history were reviewed and updated as appropriate: allergies, past family history, past medical history, past social history, past surgical history, and problem list.    History of Present Illness  The patient is a 34-year-old gentleman being evaluated for muscle weakness and fatigue. He is accompanied by his wife.    Intermittent muscle weakness is reported, with some days being more severe than others. A gradual progression to complete muscle fatigue is noted, with the ability to lift weights but experiencing rapid muscle exhaustion. Fatigue is particularly noticeable during simple tasks such as holding a book for his child or ascending and descending stairs. On particularly bad days, shortness of breath occurs even after minimal exertion, such as standing up from a seated position. Regular numbness and tingling are present in the arms, occasionally extending to the back and neck. No neck injuries are reported, but regular neck pain is described, particularly in the upper neck and left shoulder blade area. Double vision is not experienced, although eyes become tired towards the evening, making it difficult to keep them open and focus. No ptosis is observed, but a sensation of heaviness in the forehead is reported. Frequent eye closure to rest them and an underlying tremor in both eyelids, which becomes more pronounced when fatigued, are noted. Difficulty chewing gum due to jaw fatigue is mentioned, but no  difficulty swallowing unless anxious.     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 Voice fatigue during conversation is not experienced, but frequent throat clearing is noted. Inability to perform yard work or housework without needing to rest afterward is described. Symptoms have been present for at least 6 months and have progressively worsened. Improvement is noted after resting in a quiet room for an hour, but symptoms worsen by the end of the day if rest is not taken during the day. Balance varies, with some days being better than others, but overall deterioration over the years is reported. A few episodes of leg numbness and falls occurred last fall, but no recent episodes are mentioned. Vitamin B12 level was checked last summer. Various specialists have been seen, and Depakote 500 mg extended release was prescribed by a psychiatrist, which was discontinued a week ago, leading to a slight worsening of numbness and tingling.    Body-wide tremors have been experienced since 12/2024, occurring both at rest and during muscle strain. Lorazepam was taken this morning to stop the shaking. Openness to the possibility that symptoms may be psychosomatic is expressed.    Graves' disease is present, and a thyroidectomy was performed last year. Thyroid levels are managed by an endocrinologist and have been stable since last fall.    A panic disorder has been present since 2012, manifesting as physical symptoms rather than mental anxiety. Atrial fibrillation occurred a couple of years ago as part of the Graves' disease recurrence.    Sleep was terrible due to fatigue, leading to a consultation with sleep medicine and the acquisition of a CPAP machine, which has helped sleep somewhat. Sleep rhythm is good now, and insomnia has improved with the CPAP. Usage typically results in 6 to 7 hours of sleep.    PAST MEDICAL  HISTORY: Graves' disease, thyroidectomy, panic disorder since 2012, atrial fibrillation related to Graves' disease recurrence.    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 Negative for activity change and appetite change.   HENT:  Negative for facial swelling, trouble swallowing and voice change.    Eyes:  Positive for pain. Negative for photophobia and visual disturbance.   Respiratory:  Positive for shortness of breath. Negative for chest tightness and wheezing.    Cardiovascular:  Positive for chest pain and palpitations. Negative for leg swelling.   Gastrointestinal:  Positive for nausea. Negative for abdominal pain and vomiting.   Endocrine: Negative for cold intolerance and heat intolerance.   Musculoskeletal:  Positive for extremity weakness, neck pain and neck stiffness. Negative for arthralgias, back pain, gait problem, joint swelling and myalgias.   Neurological:  Positive for dizziness, tremors, weakness, light-headedness and numbness. Negative for seizures, syncope, facial asymmetry, speech difficulty and headaches.   Hematological:  Does not bruise/bleed easily.   Psychiatric/Behavioral:  Positive for sleep disturbance. Negative for agitation, behavioral problems, confusion, decreased concentration, dysphoric mood, hallucinations, self-injury and suicidal ideas. The patient is nervous/anxious. The patient is not hyperactive.         Objective:    Neurological Exam    Awake alert pleasant cooperative oriented in all spheres with fluent speech and normal speech comprehension.    Cranial nerves II through XII normal and symmetric.    Motor exam reveals normal symmetric tone bulk and power throughout without drift or spasticity.  No adventitious movements.  There is some tremulousness with effort symmetrically.    The sensory exam reveals normal and symmetric sensation to light touch, temperature, vibration throughout.    Coordination testing reveals smooth and accurate finger-nose-finger bilaterally, normal heel-to-shin bilaterally and normal rhythmic rapid alternating movements.  Romberg testing is negative.    Tendon reflexes are 2+ and symmetric throughout including preserved ankle jerks bilaterally.  No ankle clonus.    Physical Exam  Neck is supple with no cervical bruits and cardiac rhythm is regular.  No significant extremity edema.  Assessment/Plan:     Diagnoses and all orders for this visit:    1. Other fatigue (Primary)  -     Copper, Serum; Future  -     EMG & Nerve Conduction Test; Future  -     Aldolase; Future  -     LEMS Autoantibody Test; Future  -     Acetylcholine Receptor Antibody Panel; Future         Assessment & Plan  1. Muscle weakness and fatigue.  The patient's symptoms include muscle fatigue, numbness, tingling, and occasional difficulty with tasks such as holding a book or chewing gum. Symptoms are worse in the arms than in the legs and vary in severity day by day. The patient reports that symptoms have been worsening over the past six months. A comprehensive workup will be initiated to investigate potential neuromuscular etiologies. This will include blood tests to screen for conditions such as myasthenia gravis and Lambert-Eaton syndrome, as well as vitamin deficiencies including copper levels. An electromyography (EMG) and nerve conduction study will be conducted on the left arm and left leg. The patient has been informed about the nature of these  tests and has expressed understanding and agreement.  A functional or psychosomatic origin for these complaints certainly is within the differential at this point.    2. Fatigue.  The patient experiences significant fatigue, which worsens with physical activity and improves with rest. The fatigue is described as being more pronounced in the arms and is accompanied by shortness of breath during simple tasks. The patient reports that the fatigue has been present since the previous fall and has progressively worsened. The workup for muscle weakness will also address potential causes of fatigue.    3. Anxiety disorder.  The patient has a history of panic disorder with physical symptoms rather than mental anxiety. The patient has been on Depakote 500 mg extended release, which was discontinued a week ago, leading to a slight worsening of numbness and tingling. The patient is open to the possibility that some symptoms may be psychosomatic. Continued psychiatric follow-up is recommended to manage anxiety and mood-related symptoms.    4. Graves' disease.  The patient has a history of Graves' disease and underwent a thyroidectomy last year. Thyroid levels are currently stable and managed by an endocrinologist.    5. Sleep issues.  The patient reports improved sleep with the use of a CPAP machine, although the sleep situation is not ideal due to household circumstances. The patient experiences less insomnia since starting CPAP therapy.    60 minutes total patient care time including record review, examination, discussion and counseling, , and documentation.  Patient or patient representative verbalized consent for the use of Ambient Listening during the visit with  Christiano Boo MD for chart documentation. 5/2/2025  14:08 EDT

## 2025-05-02 ENCOUNTER — TELEPHONE (OUTPATIENT)
Dept: INTERNAL MEDICINE | Facility: CLINIC | Age: 35
End: 2025-05-02
Payer: COMMERCIAL

## 2025-05-02 LAB — ALDOLASE SERPL-CCNC: 3.6 U/L (ref 3.3–10.3)

## 2025-05-02 NOTE — TELEPHONE ENCOUNTER
[facility] sent a refill request for a 90-day supply of Trazodone 50 mg for Brian.  I do not see it on his current med list.    Thank you

## 2025-05-04 LAB — COPPER SERPL-MCNC: 87 UG/DL (ref 69–132)

## 2025-05-05 ENCOUNTER — TELEPHONE (OUTPATIENT)
Dept: NEUROLOGY | Facility: CLINIC | Age: 35
End: 2025-05-05
Payer: COMMERCIAL

## 2025-05-05 NOTE — TELEPHONE ENCOUNTER
Left voicemail for patient to return call to see if would like to move up his EMG to this Wednesday at 1:00.     If patient calls back please move appointment if time is still available.

## 2025-05-07 ENCOUNTER — PROCEDURE VISIT (OUTPATIENT)
Dept: NEUROLOGY | Facility: CLINIC | Age: 35
End: 2025-05-07
Payer: COMMERCIAL

## 2025-05-07 VITALS
DIASTOLIC BLOOD PRESSURE: 98 MMHG | BODY MASS INDEX: 32.81 KG/M2 | HEART RATE: 66 BPM | HEIGHT: 72 IN | SYSTOLIC BLOOD PRESSURE: 140 MMHG | OXYGEN SATURATION: 100 %

## 2025-05-07 DIAGNOSIS — R68.89 SPELLS OF DECREASED ATTENTIVENESS: ICD-10-CM

## 2025-05-07 DIAGNOSIS — R20.0 NUMBNESS: Primary | ICD-10-CM

## 2025-05-07 DIAGNOSIS — R53.1 WEAKNESS: ICD-10-CM

## 2025-05-07 NOTE — PROGRESS NOTES
Subjective:     Patient ID: Brian Castellon is a 34 y.o. male.    History of Present Illness  The following portions of the patient's history were reviewed and updated as appropriate: allergies, current medications, past family history, past medical history, past social history, past surgical history, and problem list.    History of Present Illness      Review of Systems     Objective:    Neurological Exam     Physical Exam    Assessment/Plan:     Diagnoses and all orders for this visit:    1. Numbness [R20.0] (Primary)    2. Weakness [R53.1]    3. Spells of decreased attentiveness  -     EEG (Hospital Performed); Future     Reassuringly, his EMG needle examination is completely normal helping to exclude processes such as myopathy, plexopathy, or radiculopathy and I reassured her from that standpoint.  However, his nerve conduction study is unreliable given technical difficulties outlined below.  His copper and aldolase levels have come back normal but we are still waiting on the rest of his laboratory workup and we will make further decisions after that workup is complete.  In addition, given his wife's concerned about observed twitching spells during sleep and occasional spells of altered awareness/staring spells, we are also moving ahead with an EEG.    Repeating his nerve conduction study at another time where his limbs can be adequately warmed we will certainly need to be considered.    Assessment & Plan      Patient or patient representative verbalized consent for the use of Ambient Listening during the visit with  Christiano Boo MD for chart documentation. 5/7/2025  13:58 EDT        EMG and Nerve Conduction Studies      History: 34-year-old gentleman with excessive numbness and fatigue.      Results: Nerve conduction studies were performed on the left upper and left lower extremity.  The study is technically compromised because of inability to warm his limbs to adequate temperature.  In that light,  his left median, left ulnar, and left sural sensory nerve action potentials were prolonged in expected latency while left radial sensory studies were normal.  Similarly, left peroneal median and tibial compound motor action potentials were prolonged in distal latency while left ulnar compound motor action potentials were normal.  Left peroneal and median F waves were present and normal in latency while tibial and left ulnar F waves were present but prolonged in latency.  H-reflexes were present and symmetric.    EMG needle examination of selected muscles of the left upper and left lower extremities as well as cervical and lumbosacral paraspinal muscles revealed no abnormal insertional activity, spontaneous activity, or motor unit remodeling.  Please see accompanying data for details.    Impression: The nerve conduction study was technically compromised due to inability to warm the limbs to adequate temperature.  Results should be interpreted with caution.    The EMG needle examination of the left upper and left lower extremities was completely normal with no electrophysiologic data to support an underlying myopathy, plexopathy, or radiculopathy.    Christiano Boo M.D.              Dictated utilizing Dragon dictation.

## 2025-05-10 LAB
ACHR BIND AB SER-SCNC: <0.07 NMOL/L (ref 0–0.24)
ACHR BLOCK AB SER-ACNC: 15 % (ref 0–25)
ACHR MOD AB SER QL FC: 0 % (ref 0–45)

## 2025-05-27 LAB — Lab: NORMAL

## 2025-05-30 ENCOUNTER — RESULTS FOLLOW-UP (OUTPATIENT)
Dept: NEUROLOGY | Facility: CLINIC | Age: 35
End: 2025-05-30
Payer: COMMERCIAL

## 2025-05-30 NOTE — TELEPHONE ENCOUNTER
Detailed Message Left:  Lab work looks good. Based on these tests there is no clear evidence for muscle disease, myasthenia, or Lambert-Eaton syndrome. This is good news.

## 2025-05-30 NOTE — TELEPHONE ENCOUNTER
Hub OK To Relay:  Lab work looks good. Based on these tests there is no clear evidence for muscle disease, myasthenia, or Lambert-Eaton syndrome. This is good news.

## 2025-06-09 ENCOUNTER — HOSPITAL ENCOUNTER (OUTPATIENT)
Dept: NEUROLOGY | Facility: HOSPITAL | Age: 35
Discharge: HOME | End: 2025-06-09
Admitting: PSYCHIATRY & NEUROLOGY
Payer: COMMERCIAL

## 2025-06-09 DIAGNOSIS — R68.89 SPELLS OF DECREASED ATTENTIVENESS: ICD-10-CM

## 2025-06-09 PROCEDURE — 95813 EEG EXTND MNTR 61-119 MIN: CPT

## 2025-06-12 ENCOUNTER — TELEPHONE (OUTPATIENT)
Dept: NEUROLOGY | Facility: CLINIC | Age: 35
End: 2025-06-12
Payer: COMMERCIAL

## 2025-06-18 ENCOUNTER — OFFICE VISIT (OUTPATIENT)
Dept: INTERNAL MEDICINE | Facility: CLINIC | Age: 35
End: 2025-06-18
Payer: COMMERCIAL

## 2025-06-18 VITALS
HEART RATE: 110 BPM | OXYGEN SATURATION: 99 % | BODY MASS INDEX: 32.94 KG/M2 | HEIGHT: 72 IN | RESPIRATION RATE: 16 BRPM | SYSTOLIC BLOOD PRESSURE: 130 MMHG | DIASTOLIC BLOOD PRESSURE: 80 MMHG | WEIGHT: 243.2 LBS

## 2025-06-18 DIAGNOSIS — R00.0 TACHYCARDIA: ICD-10-CM

## 2025-06-18 DIAGNOSIS — R79.89 ELEVATED SERUM CREATININE: Primary | ICD-10-CM

## 2025-06-18 PROCEDURE — 99214 OFFICE O/P EST MOD 30 MIN: CPT | Performed by: INTERNAL MEDICINE

## 2025-06-24 NOTE — PROGRESS NOTES
"Chief Complaint  Fatigue (Nausea, weakness, lightheaded on and off)    Subjective        Brian Castellon presents to [facility]  Fatigue  Symptoms: fatigue      Here for follow up   Did have further workup with neurology without any identifiable etiology to explain his symptoms  Rheumatology also, felt like his RODRIGUEZ pattern was more than likely just false positive from grave's disease  They did recommend renal eval   He still has weakness, nausea, lightheadedness and tachycardia  Working with psych   Endo had discussed considering NP or armour thyroid     Objective   Vital Signs:  /80   Pulse 110   Resp 16   Ht 182.9 cm (72\")   Wt 110 kg (243 lb 3.2 oz)   SpO2 99%   BMI 32.98 kg/m²   Estimated body mass index is 32.98 kg/m² as calculated from the following:    Height as of this encounter: 182.9 cm (72\").    Weight as of this encounter: 110 kg (243 lb 3.2 oz).      Physical Exam  Vitals and nursing note reviewed.   Constitutional:       General: He is not in acute distress.     Appearance: Normal appearance. He is not toxic-appearing.   HENT:      Head: Normocephalic and atraumatic.      Right Ear: External ear normal.      Left Ear: External ear normal.      Nose: Nose normal.   Eyes:      General: No scleral icterus.        Right eye: No discharge.         Left eye: No discharge.      Extraocular Movements: Extraocular movements intact.      Conjunctiva/sclera: Conjunctivae normal.      Pupils: Pupils are equal, round, and reactive to light.   Cardiovascular:      Rate and Rhythm: Normal rate and regular rhythm.      Pulses: Normal pulses.      Heart sounds: Normal heart sounds. No murmur heard.  Pulmonary:      Effort: Pulmonary effort is normal.   Musculoskeletal:         General: Normal range of motion.   Skin:     General: Skin is warm.      Capillary Refill: Capillary refill takes less than 2 seconds.   Neurological:      General: No focal deficit " present.      Mental Status: He is alert and oriented to person, place, and time. Mental status is at baseline.      Cranial Nerves: No cranial nerve deficit.      Gait: Gait normal.   Psychiatric:         Mood and Affect: Mood normal.         Behavior: Behavior normal.         Thought Content: Thought content normal.         Judgment: Judgment normal.        Result Review :  The following data was reviewed by: Derik Sheehan MD on 06/18/2025:  Common labs          2/24/2025    08:26   Common Labs   Glucose 88    BUN 15    Creatinine 1.37    Sodium 142    Potassium 4.5    Chloride 105    Calcium 9.5    Albumin 4.3    Total Bilirubin 0.4    Alkaline Phosphatase 74    AST (SGOT) 22    ALT (SGPT) 21    WBC 5.3    Hemoglobin 15.7    Hematocrit 46.2    Platelets 200    Total Cholesterol 191    Triglycerides 114    HDL Cholesterol 34    LDL Cholesterol  136    Hemoglobin A1C 5.4      Data reviewed : prior office notes reviewed          Assessment and Plan   Diagnoses and all orders for this visit:    1. Elevated serum creatinine (Primary)  -     Ambulatory Referral to Nephrology    2. Tachycardia  -     Holter Monitor - 72 Hour Up To 15 Days; Future  Reviewed further workup given history of afib I think 7 day holter to eval for recurrence (of note that occurred during Grave's hypothyroidism)  I don't think stress testing would be of use at this time but we did discuss that as something to consider     3. 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  Patient was given instructions and counseling regarding his condition or for health maintenance advice. Please see specific information pulled into the AVS if  appropriate.

## 2025-07-10 ENCOUNTER — TRANSCRIBE ORDERS (OUTPATIENT)
Dept: ADMINISTRATIVE | Facility: HOSPITAL | Age: 35
End: 2025-07-10
Payer: COMMERCIAL

## 2025-07-10 DIAGNOSIS — N18.2 CHRONIC KIDNEY DISEASE (CKD), ACTIVE MEDICAL MANAGEMENT WITHOUT DIALYSIS, STAGE 2 (MILD): Primary | ICD-10-CM

## 2025-07-21 ENCOUNTER — HOSPITAL ENCOUNTER (OUTPATIENT)
Dept: CARDIOLOGY | Facility: HOSPITAL | Age: 35
Discharge: HOME | End: 2025-07-21
Admitting: INTERNAL MEDICINE
Payer: COMMERCIAL

## 2025-07-21 DIAGNOSIS — R00.0 TACHYCARDIA: ICD-10-CM

## 2025-07-21 PROCEDURE — 93246 EXT ECG>7D<15D RECORDING: CPT

## 2025-07-29 ENCOUNTER — TRANSCRIBE ORDERS (OUTPATIENT)
Dept: LAB | Facility: HOSPITAL | Age: 35
End: 2025-07-29
Payer: COMMERCIAL

## 2025-07-29 ENCOUNTER — HOSPITAL ENCOUNTER (OUTPATIENT)
Dept: ULTRASOUND IMAGING | Facility: HOSPITAL | Age: 35
Discharge: HOME | End: 2025-07-29
Payer: COMMERCIAL

## 2025-07-29 ENCOUNTER — LAB (OUTPATIENT)
Dept: LAB | Facility: HOSPITAL | Age: 35
End: 2025-07-29
Payer: COMMERCIAL

## 2025-07-29 DIAGNOSIS — N18.2 CHRONIC KIDNEY DISEASE, STAGE II (MILD): Primary | ICD-10-CM

## 2025-07-29 DIAGNOSIS — N18.2 CHRONIC KIDNEY DISEASE (CKD), ACTIVE MEDICAL MANAGEMENT WITHOUT DIALYSIS, STAGE 2 (MILD): ICD-10-CM

## 2025-07-29 DIAGNOSIS — N18.2 CHRONIC KIDNEY DISEASE, STAGE II (MILD): ICD-10-CM

## 2025-07-29 LAB
ANION GAP SERPL CALCULATED.3IONS-SCNC: 8 MMOL/L (ref 5–15)
BACTERIA UR QL AUTO: NORMAL /HPF
BILIRUB UR QL STRIP: NEGATIVE
BUN SERPL-MCNC: 12 MG/DL (ref 6–20)
BUN/CREAT SERPL: 9.2 (ref 7–25)
CALCIUM SPEC-SCNC: 9.4 MG/DL (ref 8.6–10.5)
CHLORIDE SERPL-SCNC: 100 MMOL/L (ref 98–107)
CLARITY UR: CLEAR
CO2 SERPL-SCNC: 29 MMOL/L (ref 22–29)
COLOR UR: YELLOW
CREAT SERPL-MCNC: 1.31 MG/DL (ref 0.76–1.27)
CREAT UR-MCNC: 17.8 MG/DL
EGFRCR SERPLBLD CKD-EPI 2021: 72.3 ML/MIN/1.73
GLUCOSE SERPL-MCNC: 94 MG/DL (ref 65–99)
GLUCOSE UR STRIP-MCNC: NEGATIVE MG/DL
HGB UR QL STRIP.AUTO: NEGATIVE
HYALINE CASTS UR QL AUTO: NORMAL /LPF
KETONES UR QL STRIP: NEGATIVE
LEUKOCYTE ESTERASE UR QL STRIP.AUTO: ABNORMAL
NITRITE UR QL STRIP: NEGATIVE
PH UR STRIP.AUTO: 7 [PH] (ref 5–8)
POTASSIUM SERPL-SCNC: 4.1 MMOL/L (ref 3.5–5.2)
PROT ?TM UR-MCNC: <4 MG/DL
PROT UR QL STRIP: NEGATIVE
PROT/CREAT UR: NORMAL MG/G{CREAT}
RBC # UR STRIP: NORMAL /HPF
REF LAB TEST METHOD: NORMAL
SODIUM SERPL-SCNC: 137 MMOL/L (ref 136–145)
SP GR UR STRIP: <=1.005 (ref 1–1.03)
SQUAMOUS #/AREA URNS HPF: NORMAL /HPF
UROBILINOGEN UR QL STRIP: ABNORMAL
WBC # UR STRIP: NORMAL /HPF

## 2025-07-29 PROCEDURE — 82570 ASSAY OF URINE CREATININE: CPT

## 2025-07-29 PROCEDURE — 36415 COLL VENOUS BLD VENIPUNCTURE: CPT

## 2025-07-29 PROCEDURE — 84156 ASSAY OF PROTEIN URINE: CPT

## 2025-07-29 PROCEDURE — 81001 URINALYSIS AUTO W/SCOPE: CPT

## 2025-07-29 PROCEDURE — 80048 BASIC METABOLIC PNL TOTAL CA: CPT

## 2025-07-29 PROCEDURE — 76775 US EXAM ABDO BACK WALL LIM: CPT

## 2025-08-01 LAB
CV ZIO BASELINE AVG BPM: 62 BPM
CV ZIO BASELINE BPM HIGH: 161 BPM
CV ZIO BASELINE BPM LOW: 41 BPM
CV ZIO DEVICE ANALYSIS TIME: NORMAL
CV ZIO ECT SVE COUNT: 45 EPISODES
CV ZIO ECT SVE CPLT COUNT: 1 EPISODES
CV ZIO ECT SVE CPLT FREQ: NORMAL
CV ZIO ECT SVE FREQ: NORMAL
CV ZIO ECT SVE TPLT COUNT: 1 EPISODES
CV ZIO ECT SVE TPLT FREQ: NORMAL
CV ZIO ECT VE COUNT: 0 EPISODES
CV ZIO ECT VE CPLT COUNT: 0 EPISODES
CV ZIO ECT VE CPLT FREQ: 0
CV ZIO ECT VE FREQ: 0
CV ZIO ECT VE TPLT COUNT: 0 EPISODES
CV ZIO ECT VE TPLT FREQ: 0
CV ZIO ECTOPIC SVE COUPLET RAW PERCENT: 0 %
CV ZIO ECTOPIC SVE ISOLATED PERCENT: 0.01 %
CV ZIO ECTOPIC SVE TRIPLET RAW PERCENT: 0 %
CV ZIO ECTOPIC VE COUPLET RAW PERCENT: 0 %
CV ZIO ECTOPIC VE ISOLATED PERCENT: 0 %
CV ZIO ECTOPIC VE TRIPLET RAW PERCENT: 0 %
CV ZIO ENROLLMENT END: NORMAL
CV ZIO ENROLLMENT START: NORMAL
CV ZIO PATIENT EVENTS DIARIES: 19
CV ZIO PATIENT EVENTS TRIGGERS: 12
CV ZIO PAUSE COUNT: 0
CV ZIO PRESCRIPTION STATUS: NORMAL
CV ZIO SVT AVG BPM: 117 BPM
CV ZIO SVT BPM HIGH: 138 BPM
CV ZIO SVT BPM LOW: 55 BPM
CV ZIO SVT COUNT: 1
CV ZIO SVT F EPI AVG BPM: 117 BPM
CV ZIO SVT F EPI BEATS: 36 BEATS
CV ZIO SVT F EPI BPM HIGH: 138 BPM
CV ZIO SVT F EPI BPM LOW: 55 BPM
CV ZIO SVT F EPI DUR: 19 SEC
CV ZIO SVT F EPI END: NORMAL
CV ZIO SVT F EPI START: NORMAL
CV ZIO SVT L EPI AVG BPM: 117 BPM
CV ZIO SVT L EPI BEATS: 36 BEATS
CV ZIO SVT L EPI BPM HIGH: 138 BPM
CV ZIO SVT L EPI BPM LOW: 55 BPM
CV ZIO SVT L EPI DUR: 19 SEC
CV ZIO SVT L EPI END: NORMAL
CV ZIO SVT L EPI START: NORMAL
CV ZIO SVT SYMPT IN PT: NORMAL
CV ZIO TOTAL  ENROLLMENT PERIOD: NORMAL
CV ZIO VT COUNT: 0